# Patient Record
Sex: MALE | Race: WHITE | NOT HISPANIC OR LATINO | Employment: FULL TIME | URBAN - METROPOLITAN AREA
[De-identification: names, ages, dates, MRNs, and addresses within clinical notes are randomized per-mention and may not be internally consistent; named-entity substitution may affect disease eponyms.]

---

## 2023-09-29 ENCOUNTER — HOSPITAL ENCOUNTER (INPATIENT)
Facility: HOSPITAL | Age: 28
LOS: 2 days | Discharge: HOME | DRG: 369 | End: 2023-10-01
Attending: STUDENT IN AN ORGANIZED HEALTH CARE EDUCATION/TRAINING PROGRAM | Admitting: INTERNAL MEDICINE
Payer: COMMERCIAL

## 2023-09-29 DIAGNOSIS — R07.9 CHEST PAIN: ICD-10-CM

## 2023-09-29 DIAGNOSIS — R53.1 WEAKNESS: ICD-10-CM

## 2023-09-29 DIAGNOSIS — D64.9 SYMPTOMATIC ANEMIA: ICD-10-CM

## 2023-09-29 LAB
ABORH RETYPE: NORMAL
ALBUMIN SERPL-MCNC: 3 G/DL (ref 3.5–5)
ALBUMIN/GLOB SERPL: 0.9 RATIO (ref 1.1–2)
ALP SERPL-CCNC: 54 UNIT/L (ref 40–150)
ALT SERPL-CCNC: 11 UNIT/L (ref 0–55)
APPEARANCE UR: CLEAR
APTT PPP: 28 SECONDS (ref 23.2–33.7)
AST SERPL-CCNC: 13 UNIT/L (ref 5–34)
BACTERIA #/AREA URNS AUTO: NORMAL /HPF
BASOPHILS # BLD AUTO: 0.01 X10(3)/MCL
BASOPHILS NFR BLD AUTO: 0.1 %
BILIRUB SERPL-MCNC: 0.2 MG/DL
BILIRUB UR QL STRIP.AUTO: NEGATIVE
BNP BLD-MCNC: 146.5 PG/ML
BUN SERPL-MCNC: 13.2 MG/DL (ref 8.9–20.6)
CALCIUM SERPL-MCNC: 8.5 MG/DL (ref 8.4–10.2)
CHLORIDE SERPL-SCNC: 109 MMOL/L (ref 98–107)
CO2 SERPL-SCNC: 25 MMOL/L (ref 22–29)
COLOR UR AUTO: YELLOW
CREAT SERPL-MCNC: 1.33 MG/DL (ref 0.73–1.18)
EOSINOPHIL # BLD AUTO: 0.09 X10(3)/MCL (ref 0–0.9)
EOSINOPHIL NFR BLD AUTO: 1.1 %
ERYTHROCYTE [DISTWIDTH] IN BLOOD BY AUTOMATED COUNT: 18.5 % (ref 11.5–17)
FERRITIN SERPL-MCNC: 69.71 NG/ML (ref 21.81–274.66)
GFR SERPLBLD CREATININE-BSD FMLA CKD-EPI: >60 MLS/MIN/1.73/M2
GLOBULIN SER-MCNC: 3.5 GM/DL (ref 2.4–3.5)
GLUCOSE SERPL-MCNC: 84 MG/DL (ref 74–100)
GLUCOSE UR QL STRIP.AUTO: NEGATIVE
GROUP & RH: NORMAL
HCT VFR BLD AUTO: 15 % (ref 42–52)
HGB BLD-MCNC: 4.3 G/DL (ref 14–18)
IMM GRANULOCYTES # BLD AUTO: 0.03 X10(3)/MCL (ref 0–0.04)
IMM GRANULOCYTES NFR BLD AUTO: 0.4 %
INDIRECT COOMBS GEL: NORMAL
INR PPP: 1
IRON SATN MFR SERPL: 14 % (ref 20–50)
IRON SERPL-MCNC: 37 UG/DL (ref 65–175)
KETONES UR QL STRIP.AUTO: NEGATIVE
LDH SERPL-CCNC: 164 U/L (ref 125–220)
LEUKOCYTE ESTERASE UR QL STRIP.AUTO: NEGATIVE
LYMPHOCYTES # BLD AUTO: 2.11 X10(3)/MCL (ref 0.6–4.6)
LYMPHOCYTES NFR BLD AUTO: 26.8 %
MCH RBC QN AUTO: 26.9 PG (ref 27–31)
MCHC RBC AUTO-ENTMCNC: 28.7 G/DL (ref 33–36)
MCV RBC AUTO: 93.8 FL (ref 80–94)
MONOCYTES # BLD AUTO: 0.37 X10(3)/MCL (ref 0.1–1.3)
MONOCYTES NFR BLD AUTO: 4.7 %
NEUTROPHILS # BLD AUTO: 5.26 X10(3)/MCL (ref 2.1–9.2)
NEUTROPHILS NFR BLD AUTO: 66.9 %
NITRITE UR QL STRIP.AUTO: NEGATIVE
NRBC BLD AUTO-RTO: 0.6 %
PH UR STRIP.AUTO: 6 [PH]
PLATELET # BLD AUTO: 355 X10(3)/MCL (ref 130–400)
PMV BLD AUTO: 9.6 FL (ref 7.4–10.4)
POTASSIUM SERPL-SCNC: 3.9 MMOL/L (ref 3.5–5.1)
PROT SERPL-MCNC: 6.5 GM/DL (ref 6.4–8.3)
PROT UR QL STRIP.AUTO: NEGATIVE
PROTHROMBIN TIME: 12.8 SECONDS (ref 12.5–14.5)
RBC # BLD AUTO: 1.6 X10(6)/MCL (ref 4.7–6.1)
RBC #/AREA URNS AUTO: <5 /HPF
RBC UR QL AUTO: NEGATIVE
RET# (OHS): 0.21 X10E6/UL (ref 0.03–0.1)
RETICULOCYTE COUNT AUTOMATED (OLG): 13.96 % (ref 1.1–2.1)
SODIUM SERPL-SCNC: 144 MMOL/L (ref 136–145)
SP GR UR STRIP.AUTO: 1.01 (ref 1–1.03)
SPECIMEN OUTDATE: NORMAL
SQUAMOUS #/AREA URNS AUTO: <5 /HPF
TIBC SERPL-MCNC: 228 UG/DL (ref 69–240)
TIBC SERPL-MCNC: 265 UG/DL (ref 250–450)
TRANSFERRIN SERPL-MCNC: 234 MG/DL (ref 174–364)
TROPONIN I SERPL-MCNC: <0.01 NG/ML (ref 0–0.04)
TSH SERPL-ACNC: 1.81 UIU/ML (ref 0.35–4.94)
UROBILINOGEN UR STRIP-ACNC: 0.2
WBC # SPEC AUTO: 7.87 X10(3)/MCL (ref 4.5–11.5)
WBC #/AREA URNS AUTO: <5 /HPF

## 2023-09-29 PROCEDURE — 11000001 HC ACUTE MED/SURG PRIVATE ROOM

## 2023-09-29 PROCEDURE — 21400001 HC TELEMETRY ROOM

## 2023-09-29 PROCEDURE — 85730 THROMBOPLASTIN TIME PARTIAL: CPT | Performed by: PHYSICIAN ASSISTANT

## 2023-09-29 PROCEDURE — 82728 ASSAY OF FERRITIN: CPT | Performed by: STUDENT IN AN ORGANIZED HEALTH CARE EDUCATION/TRAINING PROGRAM

## 2023-09-29 PROCEDURE — 63600175 PHARM REV CODE 636 W HCPCS: Performed by: STUDENT IN AN ORGANIZED HEALTH CARE EDUCATION/TRAINING PROGRAM

## 2023-09-29 PROCEDURE — 99285 EMERGENCY DEPT VISIT HI MDM: CPT | Mod: 25

## 2023-09-29 PROCEDURE — 80053 COMPREHEN METABOLIC PANEL: CPT | Performed by: PHYSICIAN ASSISTANT

## 2023-09-29 PROCEDURE — 83615 LACTATE (LD) (LDH) ENZYME: CPT | Performed by: STUDENT IN AN ORGANIZED HEALTH CARE EDUCATION/TRAINING PROGRAM

## 2023-09-29 PROCEDURE — 84484 ASSAY OF TROPONIN QUANT: CPT | Performed by: PHYSICIAN ASSISTANT

## 2023-09-29 PROCEDURE — 86900 BLOOD TYPING SEROLOGIC ABO: CPT | Performed by: PHYSICIAN ASSISTANT

## 2023-09-29 PROCEDURE — 81001 URINALYSIS AUTO W/SCOPE: CPT | Performed by: PHYSICIAN ASSISTANT

## 2023-09-29 PROCEDURE — 83540 ASSAY OF IRON: CPT | Performed by: STUDENT IN AN ORGANIZED HEALTH CARE EDUCATION/TRAINING PROGRAM

## 2023-09-29 PROCEDURE — P9016 RBC LEUKOCYTES REDUCED: HCPCS | Performed by: STUDENT IN AN ORGANIZED HEALTH CARE EDUCATION/TRAINING PROGRAM

## 2023-09-29 PROCEDURE — 84443 ASSAY THYROID STIM HORMONE: CPT | Performed by: PHYSICIAN ASSISTANT

## 2023-09-29 PROCEDURE — 85045 AUTOMATED RETICULOCYTE COUNT: CPT | Performed by: STUDENT IN AN ORGANIZED HEALTH CARE EDUCATION/TRAINING PROGRAM

## 2023-09-29 PROCEDURE — 86923 COMPATIBILITY TEST ELECTRIC: CPT | Mod: 91 | Performed by: STUDENT IN AN ORGANIZED HEALTH CARE EDUCATION/TRAINING PROGRAM

## 2023-09-29 PROCEDURE — 63600175 PHARM REV CODE 636 W HCPCS: Performed by: NURSE PRACTITIONER

## 2023-09-29 PROCEDURE — 86923 COMPATIBILITY TEST ELECTRIC: CPT | Mod: 91 | Performed by: INTERNAL MEDICINE

## 2023-09-29 PROCEDURE — 83880 ASSAY OF NATRIURETIC PEPTIDE: CPT | Performed by: PHYSICIAN ASSISTANT

## 2023-09-29 PROCEDURE — 93005 ELECTROCARDIOGRAM TRACING: CPT

## 2023-09-29 PROCEDURE — C9113 INJ PANTOPRAZOLE SODIUM, VIA: HCPCS | Performed by: STUDENT IN AN ORGANIZED HEALTH CARE EDUCATION/TRAINING PROGRAM

## 2023-09-29 PROCEDURE — 96374 THER/PROPH/DIAG INJ IV PUSH: CPT

## 2023-09-29 PROCEDURE — 85610 PROTHROMBIN TIME: CPT | Performed by: PHYSICIAN ASSISTANT

## 2023-09-29 PROCEDURE — 85025 COMPLETE CBC W/AUTO DIFF WBC: CPT | Performed by: PHYSICIAN ASSISTANT

## 2023-09-29 PROCEDURE — 25500020 PHARM REV CODE 255: Performed by: STUDENT IN AN ORGANIZED HEALTH CARE EDUCATION/TRAINING PROGRAM

## 2023-09-29 RX ORDER — SODIUM CHLORIDE, SODIUM LACTATE, POTASSIUM CHLORIDE, CALCIUM CHLORIDE 600; 310; 30; 20 MG/100ML; MG/100ML; MG/100ML; MG/100ML
INJECTION, SOLUTION INTRAVENOUS CONTINUOUS
Status: DISCONTINUED | OUTPATIENT
Start: 2023-09-29 | End: 2023-10-01 | Stop reason: HOSPADM

## 2023-09-29 RX ORDER — NALOXONE HCL 0.4 MG/ML
0.02 VIAL (ML) INJECTION
Status: DISCONTINUED | OUTPATIENT
Start: 2023-09-29 | End: 2023-10-01 | Stop reason: HOSPADM

## 2023-09-29 RX ORDER — PANTOPRAZOLE SODIUM 40 MG/10ML
40 INJECTION, POWDER, LYOPHILIZED, FOR SOLUTION INTRAVENOUS 2 TIMES DAILY
Status: DISCONTINUED | OUTPATIENT
Start: 2023-09-30 | End: 2023-10-01

## 2023-09-29 RX ORDER — PANTOPRAZOLE SODIUM 40 MG/10ML
80 INJECTION, POWDER, LYOPHILIZED, FOR SOLUTION INTRAVENOUS
Status: COMPLETED | OUTPATIENT
Start: 2023-09-29 | End: 2023-09-29

## 2023-09-29 RX ORDER — UPADACITINIB 15 MG/1
15 TABLET, EXTENDED RELEASE ORAL
COMMUNITY

## 2023-09-29 RX ORDER — AMLODIPINE BESYLATE 10 MG/1
10 TABLET ORAL EVERY MORNING
COMMUNITY
Start: 2023-09-20

## 2023-09-29 RX ORDER — HYDRALAZINE HYDROCHLORIDE 20 MG/ML
10 INJECTION INTRAMUSCULAR; INTRAVENOUS EVERY 4 HOURS PRN
Status: DISCONTINUED | OUTPATIENT
Start: 2023-09-29 | End: 2023-10-01 | Stop reason: HOSPADM

## 2023-09-29 RX ORDER — MAG HYDROX/ALUMINUM HYD/SIMETH 200-200-20
30 SUSPENSION, ORAL (FINAL DOSE FORM) ORAL 4 TIMES DAILY PRN
Status: DISCONTINUED | OUTPATIENT
Start: 2023-09-29 | End: 2023-10-01 | Stop reason: HOSPADM

## 2023-09-29 RX ORDER — FEBUXOSTAT 80 MG/1
1 TABLET, FILM COATED ORAL
COMMUNITY
Start: 2023-09-02

## 2023-09-29 RX ORDER — ACETAMINOPHEN 325 MG/1
650 TABLET ORAL EVERY 6 HOURS PRN
Status: DISCONTINUED | OUTPATIENT
Start: 2023-09-29 | End: 2023-10-01 | Stop reason: HOSPADM

## 2023-09-29 RX ORDER — PROCHLORPERAZINE EDISYLATE 5 MG/ML
5 INJECTION INTRAMUSCULAR; INTRAVENOUS EVERY 6 HOURS PRN
Status: DISCONTINUED | OUTPATIENT
Start: 2023-09-29 | End: 2023-10-01 | Stop reason: HOSPADM

## 2023-09-29 RX ORDER — LOSARTAN POTASSIUM 100 MG/1
100 TABLET ORAL EVERY MORNING
COMMUNITY
Start: 2023-08-11

## 2023-09-29 RX ORDER — AMLODIPINE BESYLATE 5 MG/1
10 TABLET ORAL EVERY MORNING
Status: DISCONTINUED | OUTPATIENT
Start: 2023-09-30 | End: 2023-10-01 | Stop reason: HOSPADM

## 2023-09-29 RX ORDER — HYDROCODONE BITARTRATE AND ACETAMINOPHEN 500; 5 MG/1; MG/1
TABLET ORAL
Status: DISCONTINUED | OUTPATIENT
Start: 2023-09-29 | End: 2023-10-01 | Stop reason: HOSPADM

## 2023-09-29 RX ORDER — FEBUXOSTAT 40 MG/1
80 TABLET, FILM COATED ORAL DAILY
Status: DISCONTINUED | OUTPATIENT
Start: 2023-09-30 | End: 2023-10-01 | Stop reason: HOSPADM

## 2023-09-29 RX ORDER — SIMETHICONE 80 MG
1 TABLET,CHEWABLE ORAL 4 TIMES DAILY PRN
Status: DISCONTINUED | OUTPATIENT
Start: 2023-09-29 | End: 2023-10-01 | Stop reason: HOSPADM

## 2023-09-29 RX ORDER — ONDANSETRON 2 MG/ML
4 INJECTION INTRAMUSCULAR; INTRAVENOUS EVERY 4 HOURS PRN
Status: DISCONTINUED | OUTPATIENT
Start: 2023-09-29 | End: 2023-10-01 | Stop reason: HOSPADM

## 2023-09-29 RX ORDER — LABETALOL HYDROCHLORIDE 5 MG/ML
10 INJECTION, SOLUTION INTRAVENOUS
Status: DISCONTINUED | OUTPATIENT
Start: 2023-09-29 | End: 2023-10-01 | Stop reason: HOSPADM

## 2023-09-29 RX ORDER — TALC
6 POWDER (GRAM) TOPICAL NIGHTLY PRN
Status: DISCONTINUED | OUTPATIENT
Start: 2023-09-29 | End: 2023-10-01 | Stop reason: HOSPADM

## 2023-09-29 RX ORDER — SPIRONOLACTONE 25 MG/1
25 TABLET ORAL EVERY MORNING
COMMUNITY
Start: 2023-09-20

## 2023-09-29 RX ADMIN — LABETALOL HYDROCHLORIDE 10 MG: 5 INJECTION, SOLUTION INTRAVENOUS at 10:09

## 2023-09-29 RX ADMIN — IOPAMIDOL 100 ML: 755 INJECTION, SOLUTION INTRAVENOUS at 03:09

## 2023-09-29 RX ADMIN — PANTOPRAZOLE SODIUM 80 MG: 40 INJECTION, POWDER, FOR SOLUTION INTRAVENOUS at 03:09

## 2023-09-29 RX ADMIN — SODIUM CHLORIDE, POTASSIUM CHLORIDE, SODIUM LACTATE AND CALCIUM CHLORIDE: 600; 310; 30; 20 INJECTION, SOLUTION INTRAVENOUS at 08:09

## 2023-09-29 NOTE — ED PROVIDER NOTES
Encounter Date: 9/29/2023    SCRIBE #1 NOTE: I, Kj Marinelli, am scribing for, and in the presence of,  Dr. Zamarripa. I have scribed the following portions of the note - Other sections scribed: HPI, ROS, Physical Exam, MDM, Attending.       History     Chief Complaint   Patient presents with    Fatigue     POV with weakness, SOB, low blood counts at rheumatologist office, reports dark tarry stool. Reports hx HTN, hasn't taken meds x2 weeks. Denies blood thinners.     27 y/o male with history of HTN presents to ED for black stool.  Pt also complains of generalized weakness, SOB and abdominal cramping.  He was sent by rheumatologist for low H&H.  Pt has never required transfusion.  He has been off his HTN meds for about 2 weeks.  He is not on thinners.    The history is provided by the patient.     Review of patient's allergies indicates:  No Known Allergies  No past medical history on file.  No past surgical history on file.  No family history on file.     Review of Systems   Constitutional:         Generalized weakness    Respiratory:  Positive for shortness of breath.    Gastrointestinal:  Positive for abdominal pain (cramping) and blood in stool (black).       Physical Exam     Initial Vitals [09/29/23 1320]   BP Pulse Resp Temp SpO2   (!) 161/92 (!) 120 18 98.1 °F (36.7 °C) 100 %      MAP       --         Physical Exam    Nursing note and vitals reviewed.  Constitutional: He appears well-developed. He is not diaphoretic. No distress.   HENT:   Head: Normocephalic and atraumatic.   Nose: Nose normal.   Mouth/Throat: Oropharynx is clear and moist.   Eyes: Conjunctivae and EOM are normal. Pupils are equal, round, and reactive to light.   Cardiovascular:  Normal rate and regular rhythm.           No murmur heard.  Pulmonary/Chest: Breath sounds normal. No respiratory distress. He has no wheezes. He has no rales.   Abdominal: Abdomen is soft. He exhibits no distension. There is abdominal tenderness (umbilical).      Neurological: He is alert and oriented to person, place, and time. He has normal strength. No cranial nerve deficit.   Skin: Skin is warm. Capillary refill takes less than 2 seconds. No rash noted. There is pallor (mildly).         ED Course   Critical Care    Date/Time: 9/29/2023 7:39 PM    Performed by: Mendoza Zamarripa MD  Authorized by: Mendoza Zamarripa MD  Direct patient critical care time: 55 minutes  Total critical care time (exclusive of procedural time) : 55 minutes  Critical care time was exclusive of separately billable procedures and treating other patients.  Critical care was necessary to treat or prevent imminent or life-threatening deterioration of the following conditions: circulatory failure.  Critical care was time spent personally by me on the following activities: development of treatment plan with patient or surrogate, discussions with consultants, evaluation of patient's response to treatment, examination of patient, obtaining history from patient or surrogate, ordering and performing treatments and interventions, ordering and review of laboratory studies, pulse oximetry, re-evaluation of patient's condition, ordering and review of radiographic studies and review of old charts.        Labs Reviewed   COMPREHENSIVE METABOLIC PANEL - Abnormal; Notable for the following components:       Result Value    Chloride 109 (*)     Creatinine 1.33 (*)     Albumin Level 3.0 (*)     Albumin/Globulin Ratio 0.9 (*)     All other components within normal limits   CBC WITH DIFFERENTIAL - Abnormal; Notable for the following components:    RBC 1.60 (*)     Hgb 4.3 (*)     Hct 15.0 (*)     MCH 26.9 (*)     MCHC 28.7 (*)     RDW 18.5 (*)     All other components within normal limits   B-TYPE NATRIURETIC PEPTIDE - Abnormal; Notable for the following components:    Natriuretic Peptide 146.5 (*)     All other components within normal limits   RETICULOCYTES - Abnormal; Notable for the following  components:    Retic Cnt Auto 13.96 (*)     RET# 0.2068 (*)     All other components within normal limits   IRON AND TIBC - Abnormal; Notable for the following components:    Iron Level 37 (*)     Iron Saturation 14 (*)     All other components within normal limits   TROPONIN I - Normal   URINALYSIS, REFLEX TO URINE CULTURE - Normal   TSH - Normal   APTT - Normal   PROTIME-INR - Normal   LACTATE DEHYDROGENASE - Normal   FERRITIN - Normal   URINALYSIS, MICROSCOPIC - Normal   CBC W/ AUTO DIFFERENTIAL    Narrative:     The following orders were created for panel order CBC auto differential.  Procedure                               Abnormality         Status                     ---------                               -----------         ------                     CBC with Differential[[id number]]       Abnormal            Final result                 Please view results for these tests on the individual orders.   TYPE & SCREEN   ABORH RETYPE   PREPARE RBC SOFT        ECG Results              EKG 12-lead (Final result)  Result time 09/29/23 16:10:07      Final result by Interface, Lab In [facility] (09/29/23 16:10:07)                   Narrative:    Test Reason : R53.1,    Vent. Rate : 117 BPM     Atrial Rate : 117 BPM     P-R Int : 160 ms          QRS Dur : 080 ms      QT Int : 332 ms       P-R-T Axes : 042 030 007 degrees     QTc Int : 463 ms    Sinus tachycardia  Otherwise normal ECG  No previous ECGs available  Confirmed by Reuben Ugarte MD ([id number]) on 9/29/2023 4:09:57 PM    Referred By:             Confirmed By:Reuben Ugarte MD                                  Imaging Results              CT Abdomen Pelvis W Wo Contrast (Final result)  Result time 09/29/23 16:05:21      Final result by Reuben Elias MD (09/29/23 16:05:21)                   Impression:      No acute abnormality identified within the abdomen and pelvis.  No evidence of acute GI hemorrhage.    The bladder wall appears prominent.  Correlate with  urinalysis.      Electronically signed by: Reuben Elias  Date:    09/29/2023  Time:    16:05               Narrative:    EXAMINATION:  CT ABDOMEN PELVIS W WO CONTRAST    CLINICAL HISTORY:  GI bleed;Nausea/vomiting;periumbilical abd ttp;    TECHNIQUE:  Multidetector without and with IV contrast enhanced axial CT images of the abdomen and pelvis were obtained with coronal and sagittal reconstructions.    Automatic exposure control was utilized to reduce the patient's radiation dose.    DLP= 1810    COMPARISON:  No prior imaging available for comparison.    FINDINGS:  01. HEPATOBILIARY: No focal hepatic lesion is identified, The gallbladder is normal.    02. SPLEEN: Normal    03. PANCREAS: No focal masses or ductal dilatation.    04. ADRENALS: No adrenal nodules.    05. KIDNEYS: The right kidney demonstrates no stone, hydronephrosis, or hydroureter. No focal mass identified. The left kidney demonstrates no stone, hydronephrosis, or hydroureter. No focal mass identified.    06. LYMPHADENOPATHY/RETROPERITONEUM: There is no retroperitoneal lymphadenopathy. The abdominal aorta is normal in course and caliber.    07. BOWEL: No acute bowel related abnormalities. No evidence of appendiceal inflammation.    08. PELVIC VISCERA: The bladder wall appears prominent..  No pelvic mass.    09. PELVIC LYMPH NODES: No lymphadenopathy.    10. PERITONEUM/ABDOMINAL WALL: No ascites or implant.    11. SKELETAL: No aggressive appearing lytic/blastic lesion. No acute fractures, subluxations or dislocations.    12. LUNG BASES: The visualized lungs are unremarkable.                                       Medications   0.9%  NaCl infusion (for blood administration) (has no administration in time range)   melatonin tablet 6 mg (has no administration in time range)   ondansetron injection 4 mg (has no administration in time range)   prochlorperazine injection Soln 5 mg (has no administration in time range)   acetaminophen tablet 650 mg (has no  administration in time range)   simethicone chewable tablet 80 mg (has no administration in time range)   aluminum-magnesium hydroxide-simethicone 200-200-20 mg/5 mL suspension 30 mL (has no administration in time range)   naloxone 0.4 mg/mL injection 0.02 mg (has no administration in time range)   lactated ringers infusion (has no administration in time range)   pantoprazole injection 40 mg (has no administration in time range)   amLODIPine tablet 10 mg (has no administration in time range)   febuxostat tablet 80 mg (has no administration in time range)   hydrALAZINE injection 10 mg (has no administration in time range)   labetaloL injection 10 mg (has no administration in time range)   pantoprazole injection 80 mg (80 mg Intravenous Given 9/29/23 1520)   iopamidoL (ISOVUE-370) injection 100 mL (100 mLs Intravenous Given 9/29/23 1545)     Medical Decision Making  Amount and/or Complexity of Data Reviewed  Labs: ordered.  Radiology: ordered.    Risk  Prescription drug management.  Decision regarding hospitalization.    Differential diagnosis (includes but is not limited to):   Symptomatic anemia, GI bleed, upper GI bleed, lower GI bleed, diverticulitis, mass lesion    MDM Narrative  28-year-old male presents for evaluation of generalized fatigue and weakness with intermittent episodes of shortness of breath.  Patient was seen by his rheumatologist who referred him here as he was significantly anemic on outpatient labs.  He does report dark stools over the past couple of days as well.  He denies any anticoagulant medications.  He does have some abdominal tenderness to palpation to the periumbilical region.  Labs are pending including coags and type and screen.  CT scan of the abdomen with a GI bleed protocol has been ordered.  IV fluid rehydration ordered.  Pain and nausea control as needed, narcotic pain medications as needed.  Anticipate need for transfusion given results on outpatient labs, however we will confirm  on our lab assay prior to transfusion.  Digital rectal exam to be performed, patient has consented to exam.    Update:  NANCY with melanotic stool, Hemoccult positive, no bright red blood per rectum.  CT scan reviewed and is overall fairly unremarkable.  Hemoglobin has come back at 4.3.  3 units of packed red cells have been ordered for transfusion.  Gastroenterology consulted, appreciate recommendations.  Will order additional iron studies and other lab indices to further workup patient has anemia origin.  Hospital Medicine consulted and has accepted the patient for admission.  Patient agrees with plan for care and has no questions at this time.  Patient remains hemodynamically stable and stable on room air.    Dispo:  Admit    My independent radiology interpretation:  As above  Point of care US (independently performed and interpreted):   Decision rules/clinical scoring:     Sepsis Perfusion Assessment:     Amount and/or Complexity of Data Reviewed  Independent historian: none   Summary of history:   External data reviewed: notes from previous admissions, notes from previous ED visits, and notes from clinic visits  Summary of data reviewed:  Prior records reviewed  Risk and benefits of testing: discussed   Labs: ordered and reviewed  Radiology: ordered and independent interpretation performed (see above or ED course)  ECG/medicine tests: ordered and independent interpretation performed (see above or ED course)  Discussion of management or test interpretation with external provider(s): discussed with hospitalist physician and discussed with GI consultant   Summary of discussion:  As above    Risk  Parenteral controlled substances   Drug therapy requiring intense monitoring for toxicity   Decision regarding hospitalization  Shared decision making     Critical Care  30-74 minutes     Data Reviewed/Counseling: I have personally reviewed the patient's vital signs, nursing notes, and other relevant tests, information, and  imaging. I had a detailed discussion regarding the historical points, exam findings, and any diagnostic results supporting the discharge diagnosis. I personally performed the history, PE, MDM and procedures as documented above and agree with the scribe's documentation.    Portions of this note were dictated using voice recognition software. Although it was reviewed for accuracy, some inherent voice recognition errors may have occurred and may be present in this document.          Scribe Attestation:   Scribe #1: I performed the above scribed service and the documentation accurately describes the services I performed. I attest to the accuracy of the note.    Attending Attestation:           Physician Attestation for Scribe:  Physician Attestation Statement for Scribe #1: I, reviewed documentation, as scribed by Kj Marinelli in my presence, and it is both accurate and complete.             ED Course as of 09/29/23 2036   Fri Sep 29, 2023   1705 NANCY: No brisk bleeding, no BRBPR, positive hemoccult, no mass [MC]   1942 EKG independently interpreted by me.  EKG: ST @ 117, no STEMI, QTc 463 [MC]      ED Course User Index  [MC] Mendoza Zamarripa MD                    Clinical Impression:   Final diagnoses:  [R53.1] Weakness  [D64.9] Symptomatic anemia        ED Disposition Condition    Admit Stable                Mendoza Zamarripa MD  09/29/23 1942       Mendoza Zamarripa MD  09/29/23 2036

## 2023-09-29 NOTE — FIRST PROVIDER EVALUATION
Medical screening examination initiated.  I have conducted a focused provider triage encounter, findings are as follows:    Chief Complaint   Patient presents with    Fatigue     POV with weakness, SOB, low blood counts at rheumatologist office, reports dark tarry stool. Reports hx HTN, hasn't taken meds x2 weeks. Denies blood thinners.     Brief history of present illness:  28 y.o. male presents to the ED with weakness, lightheadedness, and palpitations onset 2 weeks ago with dyspnea on exertion. States he has had melena that began about a week ago. Saw rheumatologist with labs done yesterday showing low H&H, unsure of values. Hx of HTN, has not taken meds in the past 2 weeks. Hx of gout. Denies abdominal pain, chest pain.    Vitals:    09/29/23 1320   BP: (!) 161/92   Pulse: (!) 120   Resp: 18   Temp: 98.1 °F (36.7 °C)   TempSrc: Oral   SpO2: 100%       Pertinent physical exam:  Awake, alert, ambulatory, non-labored respirations    Brief workup plan:  labs, UA, EKG    Preliminary workup initiated; this workup will be continued and followed by the physician or advanced practice provider that is assigned to the patient when roomed.

## 2023-09-30 ENCOUNTER — ANESTHESIA EVENT (OUTPATIENT)
Dept: SURGERY | Facility: HOSPITAL | Age: 28
DRG: 369 | End: 2023-09-30
Payer: COMMERCIAL

## 2023-09-30 ENCOUNTER — ANESTHESIA (OUTPATIENT)
Dept: SURGERY | Facility: HOSPITAL | Age: 28
DRG: 369 | End: 2023-09-30
Payer: COMMERCIAL

## 2023-09-30 LAB
ABO + RH BLD: NORMAL
ALBUMIN SERPL-MCNC: 2.9 G/DL (ref 3.5–5)
ALBUMIN/GLOB SERPL: 0.8 RATIO (ref 1.1–2)
ALP SERPL-CCNC: 59 UNIT/L (ref 40–150)
ALT SERPL-CCNC: 12 UNIT/L (ref 0–55)
AST SERPL-CCNC: 13 UNIT/L (ref 5–34)
BASOPHILS # BLD AUTO: 0.02 X10(3)/MCL
BASOPHILS NFR BLD AUTO: 0.3 %
BILIRUB SERPL-MCNC: 0.4 MG/DL
BLD PROD TYP BPU: NORMAL
BLOOD UNIT EXPIRATION DATE: NORMAL
BLOOD UNIT TYPE CODE: 1700
BLOOD UNIT TYPE CODE: 7300
BUN SERPL-MCNC: 9.7 MG/DL (ref 8.9–20.6)
CALCIUM SERPL-MCNC: 8.2 MG/DL (ref 8.4–10.2)
CHLORIDE SERPL-SCNC: 112 MMOL/L (ref 98–107)
CO2 SERPL-SCNC: 17 MMOL/L (ref 22–29)
CREAT SERPL-MCNC: 1.07 MG/DL (ref 0.73–1.18)
CROSSMATCH INTERPRETATION: NORMAL
DISPENSE STATUS: NORMAL
EOSINOPHIL # BLD AUTO: 0.12 X10(3)/MCL (ref 0–0.9)
EOSINOPHIL NFR BLD AUTO: 1.5 %
ERYTHROCYTE [DISTWIDTH] IN BLOOD BY AUTOMATED COUNT: 16.4 % (ref 11.5–17)
GFR SERPLBLD CREATININE-BSD FMLA CKD-EPI: >60 MLS/MIN/1.73/M2
GLOBULIN SER-MCNC: 3.6 GM/DL (ref 2.4–3.5)
GLUCOSE SERPL-MCNC: 81 MG/DL (ref 74–100)
HCT VFR BLD AUTO: 21.4 % (ref 42–52)
HGB BLD-MCNC: 6.7 G/DL (ref 14–18)
IMM GRANULOCYTES # BLD AUTO: 0.03 X10(3)/MCL (ref 0–0.04)
IMM GRANULOCYTES NFR BLD AUTO: 0.4 %
LYMPHOCYTES # BLD AUTO: 0.98 X10(3)/MCL (ref 0.6–4.6)
LYMPHOCYTES NFR BLD AUTO: 12.4 %
MAGNESIUM SERPL-MCNC: 2.1 MG/DL (ref 1.6–2.6)
MCH RBC QN AUTO: 27.3 PG (ref 27–31)
MCHC RBC AUTO-ENTMCNC: 31.3 G/DL (ref 33–36)
MCV RBC AUTO: 87.3 FL (ref 80–94)
MONOCYTES # BLD AUTO: 0.47 X10(3)/MCL (ref 0.1–1.3)
MONOCYTES NFR BLD AUTO: 6 %
NEUTROPHILS # BLD AUTO: 6.27 X10(3)/MCL (ref 2.1–9.2)
NEUTROPHILS NFR BLD AUTO: 79.4 %
NRBC BLD AUTO-RTO: 0.4 %
PHOSPHATE SERPL-MCNC: 3.9 MG/DL (ref 2.3–4.7)
PLATELET # BLD AUTO: 290 X10(3)/MCL (ref 130–400)
PMV BLD AUTO: 9 FL (ref 7.4–10.4)
POTASSIUM SERPL-SCNC: 4.1 MMOL/L (ref 3.5–5.1)
PROT SERPL-MCNC: 6.5 GM/DL (ref 6.4–8.3)
RBC # BLD AUTO: 2.45 X10(6)/MCL (ref 4.7–6.1)
SODIUM SERPL-SCNC: 142 MMOL/L (ref 136–145)
UNIT NUMBER: NORMAL
WBC # SPEC AUTO: 7.89 X10(3)/MCL (ref 4.5–11.5)

## 2023-09-30 PROCEDURE — 63600175 PHARM REV CODE 636 W HCPCS: Performed by: NURSE ANESTHETIST, CERTIFIED REGISTERED

## 2023-09-30 PROCEDURE — D9220A PRA ANESTHESIA: Mod: CRNA,,, | Performed by: NURSE ANESTHETIST, CERTIFIED REGISTERED

## 2023-09-30 PROCEDURE — 25000003 PHARM REV CODE 250: Performed by: ANESTHESIOLOGY

## 2023-09-30 PROCEDURE — 85025 COMPLETE CBC W/AUTO DIFF WBC: CPT | Performed by: INTERNAL MEDICINE

## 2023-09-30 PROCEDURE — 37000009 HC ANESTHESIA EA ADD 15 MINS: Performed by: INTERNAL MEDICINE

## 2023-09-30 PROCEDURE — 37000008 HC ANESTHESIA 1ST 15 MINUTES: Performed by: INTERNAL MEDICINE

## 2023-09-30 PROCEDURE — D9220A PRA ANESTHESIA: ICD-10-PCS | Mod: ANES,,, | Performed by: ANESTHESIOLOGY

## 2023-09-30 PROCEDURE — C9113 INJ PANTOPRAZOLE SODIUM, VIA: HCPCS | Performed by: NURSE PRACTITIONER

## 2023-09-30 PROCEDURE — 83735 ASSAY OF MAGNESIUM: CPT | Performed by: INTERNAL MEDICINE

## 2023-09-30 PROCEDURE — 25000003 PHARM REV CODE 250: Performed by: NURSE ANESTHETIST, CERTIFIED REGISTERED

## 2023-09-30 PROCEDURE — 84100 ASSAY OF PHOSPHORUS: CPT | Performed by: INTERNAL MEDICINE

## 2023-09-30 PROCEDURE — D9220A PRA ANESTHESIA: ICD-10-PCS | Mod: CRNA,,, | Performed by: NURSE ANESTHETIST, CERTIFIED REGISTERED

## 2023-09-30 PROCEDURE — 21400001 HC TELEMETRY ROOM

## 2023-09-30 PROCEDURE — 43235 EGD DIAGNOSTIC BRUSH WASH: CPT | Performed by: INTERNAL MEDICINE

## 2023-09-30 PROCEDURE — 25000003 PHARM REV CODE 250: Performed by: INTERNAL MEDICINE

## 2023-09-30 PROCEDURE — 80053 COMPREHEN METABOLIC PANEL: CPT | Performed by: INTERNAL MEDICINE

## 2023-09-30 PROCEDURE — 63600175 PHARM REV CODE 636 W HCPCS: Performed by: NURSE PRACTITIONER

## 2023-09-30 PROCEDURE — D9220A PRA ANESTHESIA: Mod: ANES,,, | Performed by: ANESTHESIOLOGY

## 2023-09-30 PROCEDURE — 11000001 HC ACUTE MED/SURG PRIVATE ROOM

## 2023-09-30 PROCEDURE — P9016 RBC LEUKOCYTES REDUCED: HCPCS | Performed by: STUDENT IN AN ORGANIZED HEALTH CARE EDUCATION/TRAINING PROGRAM

## 2023-09-30 PROCEDURE — P9016 RBC LEUKOCYTES REDUCED: HCPCS | Performed by: INTERNAL MEDICINE

## 2023-09-30 RX ORDER — LIDOCAINE HYDROCHLORIDE 20 MG/ML
INJECTION, SOLUTION EPIDURAL; INFILTRATION; INTRACAUDAL; PERINEURAL
Status: DISCONTINUED | OUTPATIENT
Start: 2023-09-30 | End: 2023-09-30

## 2023-09-30 RX ORDER — PROPOFOL 10 MG/ML
VIAL (ML) INTRAVENOUS
Status: DISCONTINUED | OUTPATIENT
Start: 2023-09-30 | End: 2023-09-30

## 2023-09-30 RX ORDER — SODIUM CHLORIDE 0.9 % (FLUSH) 0.9 %
10 SYRINGE (ML) INJECTION
Status: CANCELLED | OUTPATIENT
Start: 2023-09-30

## 2023-09-30 RX ORDER — FAMOTIDINE 10 MG/ML
20 INJECTION INTRAVENOUS ONCE
Status: COMPLETED | OUTPATIENT
Start: 2023-09-30 | End: 2023-09-30

## 2023-09-30 RX ORDER — SODIUM CITRATE AND CITRIC ACID MONOHYDRATE 334; 500 MG/5ML; MG/5ML
30 SOLUTION ORAL ONCE
Status: COMPLETED | OUTPATIENT
Start: 2023-09-30 | End: 2023-09-30

## 2023-09-30 RX ORDER — LABETALOL 200 MG/1
200 TABLET, FILM COATED ORAL EVERY 12 HOURS
Status: DISCONTINUED | OUTPATIENT
Start: 2023-09-30 | End: 2023-09-30

## 2023-09-30 RX ORDER — HYDROCODONE BITARTRATE AND ACETAMINOPHEN 500; 5 MG/1; MG/1
TABLET ORAL ONCE
Status: DISCONTINUED | OUTPATIENT
Start: 2023-09-30 | End: 2023-10-01 | Stop reason: HOSPADM

## 2023-09-30 RX ORDER — EPINEPHRINE 0.1 MG/ML
INJECTION INTRAVENOUS
Status: DISCONTINUED
Start: 2023-09-30 | End: 2023-09-30 | Stop reason: WASHOUT

## 2023-09-30 RX ORDER — LABETALOL 200 MG/1
200 TABLET, FILM COATED ORAL EVERY 12 HOURS
Status: DISCONTINUED | OUTPATIENT
Start: 2023-09-30 | End: 2023-10-01 | Stop reason: HOSPADM

## 2023-09-30 RX ORDER — LIDOCAINE HYDROCHLORIDE 10 MG/ML
1 INJECTION, SOLUTION EPIDURAL; INFILTRATION; INTRACAUDAL; PERINEURAL ONCE
Status: DISCONTINUED | OUTPATIENT
Start: 2023-09-30 | End: 2023-10-01 | Stop reason: HOSPADM

## 2023-09-30 RX ADMIN — PROPOFOL 50 MG: 10 INJECTION, EMULSION INTRAVENOUS at 01:09

## 2023-09-30 RX ADMIN — PANTOPRAZOLE SODIUM 40 MG: 40 INJECTION, POWDER, FOR SOLUTION INTRAVENOUS at 09:09

## 2023-09-30 RX ADMIN — FAMOTIDINE 20 MG: 10 INJECTION, SOLUTION INTRAVENOUS at 12:09

## 2023-09-30 RX ADMIN — SODIUM CITRATE AND CITRIC ACID MONOHYDRATE 30 ML: 500; 334 SOLUTION ORAL at 12:09

## 2023-09-30 RX ADMIN — LABETALOL HYDROCHLORIDE 5 MG: 5 INJECTION, SOLUTION INTRAVENOUS at 11:09

## 2023-09-30 RX ADMIN — SODIUM CHLORIDE, SODIUM GLUCONATE, SODIUM ACETATE, POTASSIUM CHLORIDE AND MAGNESIUM CHLORIDE: 526; 502; 368; 37; 30 INJECTION, SOLUTION INTRAVENOUS at 01:09

## 2023-09-30 RX ADMIN — LABETALOL HYDROCHLORIDE 200 MG: 200 TABLET, FILM COATED ORAL at 10:09

## 2023-09-30 RX ADMIN — LIDOCAINE HYDROCHLORIDE 60 MG: 20 INJECTION, SOLUTION EPIDURAL; INFILTRATION; INTRACAUDAL; PERINEURAL at 01:09

## 2023-09-30 RX ADMIN — PANTOPRAZOLE SODIUM 40 MG: 40 INJECTION, POWDER, FOR SOLUTION INTRAVENOUS at 10:09

## 2023-09-30 NOTE — CONSULTS
Gastroenterology Consultation Note    Reason for Consult:  Diagnoses of Weakness, Symptomatic anemia, and Chest pain were pertinent to this visit.    PCP:   Edmond Boyer DO    Referring MD:  Self, Bryan  No address on file    Hospital Day: 1     Initial History of Present Illness (HPI):  This is a 28 y.o. male,  pmh of HTN and Rheumatoid arthritis presented to the ED with c/o fatigue, SOB.  He states that he had a wellness visit with his PCP on 9/20 and was noted to have low blood counts.  He does state that he has had black stool for about a week now. He denies anticoagulant use, takes 3 ALEVE almost daily for pain, 12 pack beer on weekends up until a month ago. .  Denies abdominal pain, fever, chills, or chest pain.     ED vitals on arrival:  Temp 98.1° F, pulse 120, resp 18, /92, SpO2 100% on RA.  ED lab work revealed H&H 4.3/15, retic count  13.96, BUN 13.2, creatinine 1.33, .5.  EKG showed sinus tachycardia, otherwise normal EKG.  .  CT abdomen pelvis with and without contrast showed no acute abnormality identified within the abdomen and pelvis.  No evidence of acute GI hemorrhage.  The bladder wall appears prominent.  Correlate with urinalysis.  Hemoccult positive on digital exam in ED. On Rinvaq (NSAID/DMARD) for his RA. He was given 80 Protonix and Type and screened for 3 units PRBCs. We are consulted for GI bleed.     ROS:  Constitutional:         Generalized weakness    Respiratory:  Positive for shortness of breath.    Gastrointestinal:  Positive for abdominal pain (cramping) and blood in stool (black).     Medical History:   Hypertension  Rheumatoid arthritis    Surgical History:   Denies past surgery    Family History:   Reviewed and non-contributory     Social History:   Social History     Tobacco Use    Smoking status: Not on file    Smokeless tobacco: Not on file   Substance Use Topics    Alcohol use: Not on file       Allergies:  Review of patient's allergies  indicates:  No Known Allergies    Medications Prior to Admission   Medication Sig Dispense Refill Last Dose    amLODIPine (NORVASC) 10 MG tablet Take 10 mg by mouth every morning.   9/28/2023    febuxostat (ULORIC) 80 mg Tab Take 1 tablet by mouth.   9/29/2023    losartan (COZAAR) 100 MG tablet Take 100 mg by mouth every morning.   9/28/2023    spironolactone (ALDACTONE) 25 MG tablet Take 25 mg by mouth every morning.   9/28/2023    upadacitinib (RINVOQ) 15 mg 24 hr tablet Take 15 mg by mouth.   9/29/2023         Vital Signs:  BP (!) 144/88   Pulse 86   Temp 99 °F (37.2 °C) (Oral)   Resp 18   Wt 114.3 kg (252 lb)   SpO2 (!) 94%   There is no height or weight on file to calculate BMI.    Physical Exam:  Constitutional: He appears well-developed. He is not diaphoretic. No distress.   HENT:   Head: Normocephalic and atraumatic.   Nose: Nose normal.   Mouth/Throat: Oropharynx is clear and moist.   Eyes: Conjunctivae and EOM are normal. Pupils are equal, round, and reactive to light.   Cardiovascular:  Normal rate and regular rhythm.           No murmur heard.  Pulmonary/Chest: Breath sounds normal. No respiratory distress. He has no wheezes. He has no rales.   Abdominal: Abdomen is soft. He exhibits no distension. There is abdominal tenderness (umbilical).      Neurological: He is alert and oriented to person, place, and time. He has normal strength. No cranial nerve deficit.   Skin: Skin is warm. Capillary refill takes less than 2 seconds. No rash noted. There is pallor (mildly).        Labs:  Recent Results (from the past 48 hour(s))   Comprehensive metabolic panel    Collection Time: 09/29/23  1:52 PM   Result Value Ref Range    Sodium Level 144 136 - 145 mmol/L    Potassium Level 3.9 3.5 - 5.1 mmol/L    Chloride 109 (H) 98 - 107 mmol/L    Carbon Dioxide 25 22 - 29 mmol/L    Glucose Level 84 74 - 100 mg/dL    Blood Urea Nitrogen 13.2 8.9 - 20.6 mg/dL    Creatinine 1.33 (H) 0.73 - 1.18 mg/dL    Calcium Level  Total 8.5 8.4 - 10.2 mg/dL    Protein Total 6.5 6.4 - 8.3 gm/dL    Albumin Level 3.0 (L) 3.5 - 5.0 g/dL    Globulin 3.5 2.4 - 3.5 gm/dL    Albumin/Globulin Ratio 0.9 (L) 1.1 - 2.0 ratio    Bilirubin Total 0.2 <=1.5 mg/dL    Alkaline Phosphatase 54 40 - 150 unit/L    Alanine Aminotransferase 11 0 - 55 unit/L    Aspartate Aminotransferase 13 5 - 34 unit/L    eGFR >60 mls/min/1.73/m2   Troponin I    Collection Time: 09/29/23  1:52 PM   Result Value Ref Range    Troponin-I <0.010 0.000 - 0.045 ng/mL   Type & Screen    Collection Time: 09/29/23  1:52 PM   Result Value Ref Range    Group & Rh B POS     Indirect Brii GEL NEG     Specimen Outdate 10/02/2023 23:59    TSH    Collection Time: 09/29/23  1:52 PM   Result Value Ref Range    Thyroid Stimulating Hormone 1.812 0.350 - 4.940 uIU/mL   APTT    Collection Time: 09/29/23  1:52 PM   Result Value Ref Range    PTT 28.0 23.2 - 33.7 seconds   Protime-INR    Collection Time: 09/29/23  1:52 PM   Result Value Ref Range    PT 12.8 12.5 - 14.5 seconds    INR 1.0 <=1.3   CBC with Differential    Collection Time: 09/29/23  1:52 PM   Result Value Ref Range    WBC 7.87 4.50 - 11.50 x10(3)/mcL    RBC 1.60 (L) 4.70 - 6.10 x10(6)/mcL    Hgb 4.3 (LL) 14.0 - 18.0 g/dL    Hct 15.0 (LL) 42.0 - 52.0 %    MCV 93.8 80.0 - 94.0 fL    MCH 26.9 (L) 27.0 - 31.0 pg    MCHC 28.7 (L) 33.0 - 36.0 g/dL    RDW 18.5 (H) 11.5 - 17.0 %    Platelet 355 130 - 400 x10(3)/mcL    MPV 9.6 7.4 - 10.4 fL    Neut % 66.9 %    Lymph % 26.8 %    Mono % 4.7 %    Eos % 1.1 %    Basophil % 0.1 %    Lymph # 2.11 0.6 - 4.6 x10(3)/mcL    Neut # 5.26 2.1 - 9.2 x10(3)/mcL    Mono # 0.37 0.1 - 1.3 x10(3)/mcL    Eos # 0.09 0 - 0.9 x10(3)/mcL    Baso # 0.01 <=0.2 x10(3)/mcL    IG# 0.03 0 - 0.04 x10(3)/mcL    IG% 0.4 %    NRBC% 0.6 %   Brain natriuretic peptide    Collection Time: 09/29/23  1:52 PM   Result Value Ref Range    Natriuretic Peptide 146.5 (H) <=100.0 pg/mL   Prepare RBC 3 Units; emergency    Collection Time:  09/29/23  1:52 PM   Result Value Ref Range    UNIT NUMBER [id number]     UNIT ABO/RH B POS     DISPENSE STATUS Issued     Unit Expiration [id number]     Product Code J5711K12     Unit Blood Type Code 7300     CROSSMATCH INTERPRETATION Compatible     UNIT NUMBER [id number]     UNIT ABO/RH B POS     DISPENSE STATUS Transfused     Unit Expiration [id number]     Product Code T9324V14     Unit Blood Type Code 7300     CROSSMATCH INTERPRETATION Compatible     UNIT NUMBER [id number]     UNIT ABO/RH B POS     DISPENSE STATUS Transfused     Unit Expiration [id number]     Product Code G1553Y24     Unit Blood Type Code 7300     CROSSMATCH INTERPRETATION Compatible    Lactate Dehydrogenase    Collection Time: 09/29/23  1:52 PM   Result Value Ref Range    Lactate Dehydrogenase 164 125 - 220 U/L   Ferritin    Collection Time: 09/29/23  1:52 PM   Result Value Ref Range    Ferritin Level 69.71 21.81 - 274.66 ng/mL   Reticulocytes    Collection Time: 09/29/23  1:52 PM   Result Value Ref Range    Retic Cnt Auto 13.96 (H) 1.1 - 2.1 %    RET# 0.2068 (H) 0.026 - 0.095 x10e6/uL   Iron Panel    Collection Time: 09/29/23  1:52 PM   Result Value Ref Range    Iron Binding Capacity Unsaturated 228 69 - 240 ug/dL    Iron Level 37 (L) 65 - 175 ug/dL    Transferrin 234 174 - 364 mg/dL    Iron Binding Capacity Total 265 250 - 450 ug/dL    Iron Saturation 14 (L) 20 - 50 %   ABORH RETYPE    Collection Time: 09/29/23  4:10 PM   Result Value Ref Range    ABORH Retype B POS    Urinalysis, Reflex to Urine Culture    Collection Time: 09/29/23  5:01 PM    Specimen: Urine   Result Value Ref Range    Color, UA Yellow Yellow, Light-Yellow, Dark Yellow, [place], Straw    Appearance, UA Clear Clear    Specific Gravity, UA 1.013 1.005 - 1.030    pH, UA 6.0 5.0 - 8.5    Protein, UA Negative Negative    Glucose, UA Negative Negative, Normal    Ketones, UA Negative Negative    Blood, UA Negative Negative    Bilirubin, UA Negative Negative     Urobilinogen, UA 0.2 0.2, 1.0, Normal    Nitrites, UA Negative Negative    Leukocyte Esterase, UA Negative Negative   Urinalysis, Microscopic    Collection Time: 09/29/23  5:01 PM   Result Value Ref Range    RBC, UA <5 <=5 /HPF    WBC, UA <5 <=5 /HPF    Squamous Epithelial Cells, UA <5 <=5 /HPF    Bacteria, UA None Seen None Seen, Rare, Occasional /HPF       CT Abdomen Pelvis W Wo Contrast   Final Result      No acute abnormality identified within the abdomen and pelvis.  No evidence of acute GI hemorrhage.      The bladder wall appears prominent.  Correlate with urinalysis.         Electronically signed by: Reuben Elias   Date:    09/29/2023   Time:    16:05          Imaging: reviewed    Endoscopy:   none      Assessment/Plan:  Symptomatic anemia  Hgb 4.3  Getting 3 units PRBC  PPI  Heme + on digital  Iron - 37  RA  On Rimvoq (NSAID/ DMARD)  HTN  NSAID use      Will plan EGD today  Continue PPI  Avoid NSAIDS    Thank you for allowing us to participate in the care of Jonathon Patterson.    Aliyah Newby NP as scribe for Dr. Rob Killian

## 2023-09-30 NOTE — PROGRESS NOTES
[facility] Hospital Medicine Progress Note        Chief Complaint: Inpatient follow-up on melena    HPI:   Mr. Padilla is a 28 year old male with a pmh of HTN and Rheumatoid arthritis presented to the ED with c/o fatigue, SOB.  He states that he had a wellness visit with his PCP on 9/20 and was noted to have low blood counts.  He does state that he has had black stool for about a week now. He denies anticoagulant use, regular NSAID use, or heavy etoh use.  Denies abdominal pain, fever, chills, or chest pain.     ED vitals on arrival:  Temp 98.1° F, pulse 120, resp 18, /92, SpO2 100% on RA.  ED lab work revealed H&H 4.3/15, retake 13.96, creatinine 1.33, .5.  EKG showed sinus tachycardia, otherwise normal EKG.  .  CT abdomen pelvis with and without contrast showed no acute abnormality identified within the abdomen and pelvis.  No evidence of acute GI hemorrhage.  The bladder wall appears prominent.  Correlate with urinalysis.  He was given 80 Protonix and Type and screened for 3 units PRBCs.  GI was consulted and he was admitted to hospital medicine for management    Interval Hx:   Patient is without any new complaints.  He is currently awaiting endoscopy.  Patient is afebrile, on room air, and hemodynamically stable.        Objective/physical exam:  General: in no acute distress  HENT: normocephalic, atraumatic  Eye: PERRL, EOMI, clear conjunctiva  Neck: full ROM, no thyromegaly, no JVD  Respiratory: clear to auscultation bilaterally  Cardiovascular: regular rate and rhythm  Gastrointestinal: non-distended, positive bowel sounds, non-tender  Musculoskeletal: no gross deformity  Integumentary: warm, dry, intact, no rashes  Neurological: cranial nerves grossly intact, no focal neurological deficit  Psychiatric: cooperative, anxious      VITAL SIGNS: 24 HRS MIN & MAX LAST   Temp  Min: 98.1 °F (36.7 °C)  Max: 99.3 °F (37.4 °C) 98.4 °F (36.9 °C)   BP  Min: 127/87  Max:  176/105 (!) 163/100   Pulse  Min: 86  Max: 120  98   Resp  Min: 17  Max: 20 19   SpO2  Min: 94 %  Max: 100 % 98 % (room air)     I have reviewed the following labs:  Recent Labs   Lab 09/29/23  1352 09/30/23  1015   WBC 7.87 7.89   RBC 1.60* 2.45*   HGB 4.3* 6.7*   HCT 15.0* 21.4*   MCV 93.8 87.3   MCH 26.9* 27.3   MCHC 28.7* 31.3*   RDW 18.5* 16.4    290   MPV 9.6 9.0     Recent Labs   Lab 09/29/23  1352 09/30/23  0815    142   K 3.9 4.1   CO2 25 17*   BUN 13.2 9.7   CREATININE 1.33* 1.07   CALCIUM 8.5 8.2*   MG  --  2.10   ALBUMIN 3.0* 2.9*   ALKPHOS 54 59   ALT 11 12   AST 13 13   BILITOT 0.2 0.4     Microbiology Results (last 7 days)       ** No results found for the last 168 hours. **             See below for Radiology    Scheduled Med:   EPINEPHrine        0.9%  NaCl infusion (for blood administration)   Intravenous Once    amLODIPine  10 mg Oral QAM    febuxostat  80 mg Oral Daily    LIDOcaine (PF) 10 mg/ml (1%)  1 mL Intradermal Once    pantoprazole  40 mg Intravenous BID      Continuous Infusions:   lactated ringers 100 mL/hr at 09/29/23 2055      PRN Meds:  EPINEPHrine, 0.9%  NaCl infusion (for blood administration), acetaminophen, aluminum-magnesium hydroxide-simethicone, hydrALAZINE, labetaloL, melatonin, naloxone, ondansetron, prochlorperazine, simethicone     Assessment/Plan:  Melena   Iron-deficiency anemia   Acute kidney injury, resolved  Rheumatoid arthritis  Immunocompromised   Hypertensive urgency      Plan:  Blood pressure control  Patient has been transfused.  Continue to monitor hemoglobin and transfuse to keep greater than 7  Patient is slated for endoscopy per Gastroenterology      Critical Care Diagnosis: Hypertensive Urgency requiring IV antihypertensives  Critical Care Interventions: Hands-on evaluation, review of labs, radiographs, medical records, and discussion with the patient and medical staff in order to assess and manage the high probability of imminent or  life-threatening deterioration of cardio-respiratory status requiring vasopressor support and/or intubation and mechanical ventilation.  Critical Care Time Spent: 35 minutes        VTE prophylaxis:  SCDs    Patient condition:  Stable    Anticipated discharge and Disposition:   TBD      All diagnosis and differential diagnosis have been reviewed; assessment and plan has been documented; I have personally reviewed the labs and test results that are presently available; I have reviewed the patients medication list; I have reviewed the consulting providers response and recommendations. I have reviewed or attempted to review medical records based upon their availability    All of the patient's questions have been  addressed and answered. Patient's is agreeable to the above stated plan. I will continue to monitor closely and make adjustments to medical management as needed.  _____________________________________________________________________      Radiology:  I have personally reviewed the following imaging and agree with the radiologist.     CT Abdomen Pelvis W Wo Contrast  Narrative: EXAMINATION:  CT ABDOMEN PELVIS W WO CONTRAST    CLINICAL HISTORY:  GI bleed;Nausea/vomiting;periumbilical abd ttp;    TECHNIQUE:  Multidetector without and with IV contrast enhanced axial CT images of the abdomen and pelvis were obtained with coronal and sagittal reconstructions.    Automatic exposure control was utilized to reduce the patient's radiation dose.    DLP= 1810    COMPARISON:  No prior imaging available for comparison.    FINDINGS:  01. HEPATOBILIARY: No focal hepatic lesion is identified, The gallbladder is normal.    02. SPLEEN: Normal    03. PANCREAS: No focal masses or ductal dilatation.    04. ADRENALS: No adrenal nodules.    05. KIDNEYS: The right kidney demonstrates no stone, hydronephrosis, or hydroureter. No focal mass identified. The left kidney demonstrates no stone, hydronephrosis, or hydroureter. No focal mass  identified.    06. LYMPHADENOPATHY/RETROPERITONEUM: There is no retroperitoneal lymphadenopathy. The abdominal aorta is normal in course and caliber.    07. BOWEL: No acute bowel related abnormalities. No evidence of appendiceal inflammation.    08. PELVIC VISCERA: The bladder wall appears prominent..  No pelvic mass.    09. PELVIC LYMPH NODES: No lymphadenopathy.    10. PERITONEUM/ABDOMINAL WALL: No ascites or implant.    11. SKELETAL: No aggressive appearing lytic/blastic lesion. No acute fractures, subluxations or dislocations.    12. LUNG BASES: The visualized lungs are unremarkable.  Impression: No acute abnormality identified within the abdomen and pelvis.  No evidence of acute GI hemorrhage.    The bladder wall appears prominent.  Correlate with urinalysis.    Electronically signed by: Reuben Elias  Date:    09/29/2023  Time:    16:05      Grzegorz Giraldo MD   09/30/2023

## 2023-09-30 NOTE — NURSING
Nurses Note -- 4 Eyes      9/30/2023   12:00 AM      Skin assessed during: Admit      [x] No Altered Skin Integrity Present    [x]Prevention Measures Documented      [] Yes- Altered Skin Integrity Present or Discovered   [] LDA Added if Not in Epic (Describe Wound)   [] New Altered Skin Integrity was Present on Admit and Documented in LDA   [] Wound Image Taken    Wound Care Consulted? No    Attending Nurse:  Latasha Hilliard RN/Staff Member:  Yoly Laurent RN

## 2023-09-30 NOTE — PROGRESS NOTES
Procedure results reported to floor nurse. Informed of medications given, IVF administered, blood administration, diet, and patient's stability. Nurse verbalized understanding.

## 2023-09-30 NOTE — H&P
[facility] Hospital Medicine History & Physical Examination       Patient Name: Jonathon aPtterson  MRN: [id number]  Patient Class: IP- Inpatient   Admission Date: 9/29/2023  1:24 PM  Length of Stay: 0  Admitting Service: Hospital Medicine   Attending Physician: Dinesh Newby MD   Primary Care Provider: Edmond Boyer DO  History source: EMR, patient and/or patient's family    CHIEF COMPLAINT   Fatigue (POV with weakness, SOB, low blood counts at rheumatologist office, reports dark tarry stool. Reports hx HTN, hasn't taken meds x2 weeks. Denies blood thinners.)      HISTORY OF PRESENT ILLNESS:   Mr. Padilla is a 28 year old male with a pmh of HTN and Rheumatoid arthritis presented to the ED with c/o fatigue, SOB.  He states that he had a wellness visit with his PCP on 9/20 and was noted to have low blood counts.  He does state that he has had black stool for about a week now. He denies anticoagulant use, regular NSAID use, or heavy etoh use.  Denies abdominal pain, fever, chills, or chest pain.    ED vitals on arrival:  Temp 98.1° F, pulse 120, resp 18, /92, SpO2 100% on RA.  ED lab work revealed H&H 4.3/15, retake 13.96, creatinine 1.33, .5.  EKG showed sinus tachycardia, otherwise normal EKG.  .  CT abdomen pelvis with and without contrast showed no acute abnormality identified within the abdomen and pelvis.  No evidence of acute GI hemorrhage.  The bladder wall appears prominent.  Correlate with urinalysis.  He was given 80 Protonix and Type and screened for 3 units PRBCs.  GI was consulted and he was admitted to hospital medicine for management.      PAST MEDICAL HISTORY:     Hypertension  Rheumatoid arthritis    PAST SURGICAL HISTORY:     Denies past surgery    ALLERGIES:   Patient has no known allergies.    FAMILY HISTORY:   Reviewed and non-contributory     SOCIAL HISTORY:     Tobacco use:  Denies  Etoh use:  Occasional  Illicit drug use:  Denies    "    HOME MEDICATIONS:     Prior to Admission medications    Medication Sig Start Date End Date Taking? Authorizing Provider   amLODIPine (NORVASC) 10 MG tablet Take 10 mg by mouth every morning. 9/20/23  Yes Provider, Historical   febuxostat (ULORIC) 80 mg Tab Take 1 tablet by mouth. 9/2/23  Yes Provider, Historical   losartan (COZAAR) 100 MG tablet Take 100 mg by mouth every morning. 8/11/23  Yes Provider, Historical   spironolactone (ALDACTONE) 25 MG tablet Take 25 mg by mouth every morning. 9/20/23  Yes Provider, Historical   upadacitinib (RINVOQ) 15 mg 24 hr tablet Take 15 mg by mouth.   Yes Provider, Historical       REVIEW OF SYSTEMS:   Except as documented, all other systems reviewed and negative     PHYSICAL EXAM:   T 99.2 °F (37.3 °C)   BP (!) 154/96   P 99   RR 18   O2 100 %  GENERAL: Well developed, well nourished. In no acute distress, non-toxic appearing.   HEENT: normocephalic atraumatic   NECK: supple   LUNGS: Clear bilaterally, no wheezing or rales, no accessory muscle use   CVS: Regular rate and rhythm, normal peripheral perfusion  ABD: Soft, non-tender, non-distended, bowel sounds present  EXTREMITIES: no clubbing or cyanosis  SKIN: Warm, dry.   NEURO: alert and oriented, grossly without focal deficits   PSYCHIATRIC: Cooperative    LABS AND IMAGING:     Recent Labs     09/29/23  1352   WBC 7.87   RBC 1.60*   HGB 4.3*   HCT 15.0*   MCV 93.8   MCH 26.9*   MCHC 28.7*   RDW 18.5*        No results for input(s): "LACTIC" in the last 72 hours.  Recent Labs     09/29/23  1352   INR 1.0     No results for input(s): "HGBA1C", "CHOL", "TRIG", "LDL", "VLDL", "HDL" in the last 72 hours.   Recent Labs     09/29/23  1352      K 3.9   CHLORIDE 109*   CO2 25   BUN 13.2   CREATININE 1.33*   GLUCOSE 84   CALCIUM 8.5   ALBUMIN 3.0*   GLOBULIN 3.5   ALKPHOS 54   ALT 11   AST 13   BILITOT 0.2      FERRITIN 69.71   IRON 37*   TIBC 265   TSH 1.812     Recent Labs     09/29/23  1352   .5* "   TROPONINI <0.010          CT Abdomen Pelvis W Wo Contrast  Narrative: EXAMINATION:  CT ABDOMEN PELVIS W WO CONTRAST    CLINICAL HISTORY:  GI bleed;Nausea/vomiting;periumbilical abd ttp;    TECHNIQUE:  Multidetector without and with IV contrast enhanced axial CT images of the abdomen and pelvis were obtained with coronal and sagittal reconstructions.    Automatic exposure control was utilized to reduce the patient's radiation dose.    DLP= 1810    COMPARISON:  No prior imaging available for comparison.    FINDINGS:  01. HEPATOBILIARY: No focal hepatic lesion is identified, The gallbladder is normal.    02. SPLEEN: Normal    03. PANCREAS: No focal masses or ductal dilatation.    04. ADRENALS: No adrenal nodules.    05. KIDNEYS: The right kidney demonstrates no stone, hydronephrosis, or hydroureter. No focal mass identified. The left kidney demonstrates no stone, hydronephrosis, or hydroureter. No focal mass identified.    06. LYMPHADENOPATHY/RETROPERITONEUM: There is no retroperitoneal lymphadenopathy. The abdominal aorta is normal in course and caliber.    07. BOWEL: No acute bowel related abnormalities. No evidence of appendiceal inflammation.    08. PELVIC VISCERA: The bladder wall appears prominent..  No pelvic mass.    09. PELVIC LYMPH NODES: No lymphadenopathy.    10. PERITONEUM/ABDOMINAL WALL: No ascites or implant.    11. SKELETAL: No aggressive appearing lytic/blastic lesion. No acute fractures, subluxations or dislocations.    12. LUNG BASES: The visualized lungs are unremarkable.  Impression: No acute abnormality identified within the abdomen and pelvis.  No evidence of acute GI hemorrhage.    The bladder wall appears prominent.  Correlate with urinalysis.    Electronically signed by: Reuben Elias  Date:    09/29/2023  Time:    16:05      ASSESSMENT & PLAN:     1. Acute symptomatic blood loss anemia  2.  Upper GI bleed  3.  Acute kidney injury    History:  Rheumatoid arthritis, HTN    PLAN:  -Transfuse  3 units PRBCs  -GI consulted in ED  -NPO after midnight  -LR @ 100 ml/hr  -Antihypertensives as needed  -Resume home meds as appropriate  -Labs in AM    I, Mery Krueger NP have reviewed and discussed this case with Dr. Newby.  Please see addendum for further assessment and plan from attending MD.    DVT prophylaxis: SCDs  Code status: Full        ________________________________________________________________________  I, Dr. Dinesh Newby assumed care of this patient.  For the patient encounter, I performed the substantive portion of the visit, I reviewed the NPPA documentation, treatment plan, and medical decision making.  I had face to face time with this patient.  I have personally reviewed the labs and test results that are presently available. I have reviewed or attempted to review medical records based upon their availability. If patient was admitted under observational status it is with my approval.      Pleasant 28-year-old male with RA on Rinvoq and hypertension presents with melena last week and severe symptomatic anemia with a hemoglobin of less than 5.  Heme-positive stool on rectal exam in ER.  Currently has a benign abdomen and agree with remainder of above exam.  NPO, transfuse blood to keep hemoglobin above 7, plan for EGD in a.m..    Time seen: 11PM 9/29/23  Critical care time: 35 min; Critical care diagnosis:  Anemia requiring transfusion  Dinesh Newby MD

## 2023-09-30 NOTE — PROVATION PATIENT INSTRUCTIONS
Discharge Summary/Instructions after an Endoscopic Procedure  Patient Name: Jonathon Patterson  Patient MRN: [id number]  Patient YOB: 1995 Saturday, September 30, 2023  Rob Killian MD  Dear patient,  As a result of recent federal legislation (The Federal Cures Act), you may   receive lab or pathology results from your procedure in your [facility]   account before your physician is able to contact you. Your physician or   their representative will relay the results to you with their   recommendations at their soonest availability.  Thank you,  RESTRICTIONS:  During your procedure today, you received medications for sedation.  These   medications may affect your judgment, balance and coordination.  Therefore,   for 24 hours, you have the following restrictions:   - DO NOT drive a car, operate machinery, make legal/financial decisions,   sign important papers or drink alcohol.    ACTIVITY:  Today: no heavy lifting, straining or running due to procedural   sedation/anesthesia.  The following day: return to full activity including work.  DIET:  Eat and drink normally unless instructed otherwise.     TREATMENT FOR COMMON SIDE EFFECTS:  - Mild abdominal pain, nausea, belching, bloating or excessive gas:  rest,   eat lightly and use a heating pad.  - Sore Throat: treat with throat lozenges and/or gargle with warm salt   water.  - Because air was used during the procedure, expelling large amounts of air   from your rectum or belching is normal.  - If a bowel prep was taken, you may not have a bowel movement for 1-3 days.    This is normal.  SYMPTOMS TO WATCH FOR AND REPORT TO YOUR PHYSICIAN:  1. Abdominal pain or bloating, other than gas cramps.  2. Chest pain.  3. Back pain.  4. Signs of infection such as: chills or fever occurring within 24 hours   after the procedure.  5. Rectal bleeding, which would show as bright red, maroon, or black stools.   (A tablespoon of blood from the rectum is not serious,  especially if   hemorrhoids are present.)  6. Vomiting.  7. Weakness or dizziness.  GO DIRECTLY TO THE NEAREST EMERGENCY ROOM IF YOU HAVE ANY OF THE FOLLOWING:      Difficulty breathing              Chills and/or fever over 101 F   Persistent vomiting and/or vomiting blood   Severe abdominal pain   Severe chest pain   Black, tarry stools   Bleeding- more than one tablespoon   Any other symptom or condition that you feel may need urgent attention  Your doctor recommends these additional instructions:  If any biopsies were taken, your doctors clinic will contact you in 1 to 2   weeks with any results.  - Return patient to hospital [facility] for ongoing care.   - Observe patient's clinical course.   - Perform an H. pylori stool antigen (HpSA) test at the next available   appointment.   - Use Protonix (pantoprazole) 40 mg PO BID.   - Repeat upper endoscopy in 12 weeks to check healing.   - The findings and recommendations were discussed with the patient.  For questions, problems or results please call your physician - Rob Killian MD at Work:  [phone].  [facility], EMERGENCY ROOM PHONE NUMBER: [phone]  IF A COMPLICATION OR EMERGENCY SITUATION ARISES AND YOU ARE UNABLE TO REACH   YOUR PHYSICIAN - GO DIRECTLY TO THE EMERGENCY ROOM.  MD Rob Rg MD  9/30/2023 1:29:36 PM  This report has been verified and signed electronically.  Dear patient,  As a result of recent federal legislation (The Federal Cures Act), you may   receive lab or pathology results from your procedure in your [facility]   account before your physician is able to contact you. Your physician or   their representative will relay the results to you with their   recommendations at their soonest availability.  Thank you,  PROVATION

## 2023-09-30 NOTE — ANESTHESIA POSTPROCEDURE EVALUATION
Anesthesia Post Evaluation    Patient: Jonathon Patterson    Procedure(s) Performed: Procedure(s) (LRB):  EGD (ESOPHAGOGASTRODUODENOSCOPY) (N/A)    Final Anesthesia Type: general      Patient location during evaluation: PACU  Patient participation: Yes- Able to Participate  Level of consciousness: awake and alert  Post-procedure vital signs: reviewed and stable  Pain management: adequate  Airway patency: patent    PONV status at discharge: No PONV  Anesthetic complications: no      Cardiovascular status: blood pressure returned to baseline  Respiratory status: spontaneous ventilation and unassisted  Hydration status: euvolemic  Follow-up needed           Vitals Value Taken Time   /82 09/30/23 1351   Temp 36.3 °C (97.4 °F) 09/30/23 1335   Pulse 94 09/30/23 1357   Resp 25 09/30/23 1357   SpO2 91 % 09/30/23 1357   Vitals shown include unvalidated device data.      No case tracking events are documented in the log.      Pain/Whit Score: Whit Score: 10 (9/30/2023  1:58 PM)

## 2023-09-30 NOTE — TRANSFER OF CARE
"Anesthesia Transfer of Care Note    Patient: Jonathon Patterson    Procedure(s) Performed: Procedure(s) (LRB):  EGD (ESOPHAGOGASTRODUODENOSCOPY) (N/A)    Patient location: PACU    Anesthesia Type: general    Transport from OR: Transported from OR on room air with adequate spontaneous ventilation    Post pain: adequate analgesia    Post assessment: no apparent anesthetic complications    Post vital signs: stable    Level of consciousness: awake and alert    Nausea/Vomiting: no nausea/vomiting    Complications: none    Transfer of care protocol was followed      Last vitals:   Visit Vitals  /79 (BP Location: Right arm, Patient Position: Lying)   Pulse 98   Temp 36.2 °C (97.2 °F) (Skin)   Resp 15   Ht 5' 10" (1.778 m)   Wt 114.3 kg (252 lb)   SpO2 99%   BMI 36.16 kg/m²     "

## 2023-09-30 NOTE — ANESTHESIA PREPROCEDURE EVALUATION
09/30/2023  Jonathon Patterson is a 28 y.o., male.      Pre-op Assessment    I have reviewed the Patient Summary Reports.     I have reviewed the Nursing Notes. I have reviewed the NPO Status.   I have reviewed the Medications.     Review of Systems  Anesthesia Hx:  No problems with previous Anesthesia    Social:  Non-Smoker    Cardiovascular:   Hypertension Denies MI.    Denies Angina.  Denies CHF.    Pulmonary:   Denies COPD.  Denies Asthma.    Renal/:   Denies Chronic Renal Disease. no renal calculi     Hepatic/GI:   Denies GERD. Denies Liver Disease.  Denies Hepatitis.    Neurological:   Denies CVA. Denies Seizures.    Endocrine:   Denies Diabetes. Denies Hypothyroidism. Denies Hyperthyroidism.  Obesity / BMI > 30      Physical Exam  General: Well nourished, Cooperative, Alert and Oriented    Airway:  Mallampati: I   Mouth Opening: Normal  TM Distance: Normal  Tongue: Normal  Neck ROM: Normal ROM    Dental:  Intact        Anesthesia Plan  Type of Anesthesia, risks & benefits discussed:    Anesthesia Type: Gen Natural Airway  Intra-op Monitoring Plan: Standard ASA Monitors  Induction:  IV  Informed Consent: Informed consent signed with the Patient and all parties understand the risks and agree with anesthesia plan.  All questions answered. Patient consented to blood products? Yes  ASA Score: 3  Day of Surgery Review of History & Physical: H&P Update referred to the surgeon/provider.  Anesthesia Plan Notes: Getting PRBC in preop. Can proceed to the OR with 1/2 unit given.    Ready For Surgery From Anesthesia Perspective.     .

## 2023-10-01 VITALS
DIASTOLIC BLOOD PRESSURE: 95 MMHG | OXYGEN SATURATION: 97 % | HEIGHT: 70 IN | BODY MASS INDEX: 36.08 KG/M2 | TEMPERATURE: 99 F | HEART RATE: 92 BPM | RESPIRATION RATE: 25 BRPM | WEIGHT: 252 LBS | SYSTOLIC BLOOD PRESSURE: 158 MMHG

## 2023-10-01 PROBLEM — K92.2 GIB (GASTROINTESTINAL BLEEDING): Status: ACTIVE | Noted: 2023-10-01

## 2023-10-01 LAB
ALBUMIN SERPL-MCNC: 2.7 G/DL (ref 3.5–5)
ALBUMIN/GLOB SERPL: 0.9 RATIO (ref 1.1–2)
ALP SERPL-CCNC: 55 UNIT/L (ref 40–150)
ALT SERPL-CCNC: 10 UNIT/L (ref 0–55)
AST SERPL-CCNC: 15 UNIT/L (ref 5–34)
BASOPHILS # BLD AUTO: 0.02 X10(3)/MCL
BASOPHILS NFR BLD AUTO: 0.3 %
BILIRUB SERPL-MCNC: 0.7 MG/DL
BUN SERPL-MCNC: 9.3 MG/DL (ref 8.9–20.6)
CALCIUM SERPL-MCNC: 7.7 MG/DL (ref 8.4–10.2)
CHLORIDE SERPL-SCNC: 108 MMOL/L (ref 98–107)
CO2 SERPL-SCNC: 22 MMOL/L (ref 22–29)
CREAT SERPL-MCNC: 1.15 MG/DL (ref 0.73–1.18)
EOSINOPHIL # BLD AUTO: 0.23 X10(3)/MCL (ref 0–0.9)
EOSINOPHIL NFR BLD AUTO: 3 %
ERYTHROCYTE [DISTWIDTH] IN BLOOD BY AUTOMATED COUNT: 15.9 % (ref 11.5–17)
GFR SERPLBLD CREATININE-BSD FMLA CKD-EPI: >60 MLS/MIN/1.73/M2
GLOBULIN SER-MCNC: 3 GM/DL (ref 2.4–3.5)
GLUCOSE SERPL-MCNC: 79 MG/DL (ref 74–100)
HCT VFR BLD AUTO: 24.2 % (ref 42–52)
HGB BLD-MCNC: 7.6 G/DL (ref 14–18)
IMM GRANULOCYTES # BLD AUTO: 0.03 X10(3)/MCL (ref 0–0.04)
IMM GRANULOCYTES NFR BLD AUTO: 0.4 %
LYMPHOCYTES # BLD AUTO: 1.1 X10(3)/MCL (ref 0.6–4.6)
LYMPHOCYTES NFR BLD AUTO: 14.2 %
MCH RBC QN AUTO: 27.3 PG (ref 27–31)
MCHC RBC AUTO-ENTMCNC: 31.4 G/DL (ref 33–36)
MCV RBC AUTO: 87.1 FL (ref 80–94)
MONOCYTES # BLD AUTO: 0.71 X10(3)/MCL (ref 0.1–1.3)
MONOCYTES NFR BLD AUTO: 9.2 %
NEUTROPHILS # BLD AUTO: 5.66 X10(3)/MCL (ref 2.1–9.2)
NEUTROPHILS NFR BLD AUTO: 72.9 %
NRBC BLD AUTO-RTO: 0 %
PLATELET # BLD AUTO: 302 X10(3)/MCL (ref 130–400)
PMV BLD AUTO: 9.6 FL (ref 7.4–10.4)
POTASSIUM SERPL-SCNC: 4.4 MMOL/L (ref 3.5–5.1)
PROT SERPL-MCNC: 5.7 GM/DL (ref 6.4–8.3)
RBC # BLD AUTO: 2.78 X10(6)/MCL (ref 4.7–6.1)
SODIUM SERPL-SCNC: 140 MMOL/L (ref 136–145)
WBC # SPEC AUTO: 7.75 X10(3)/MCL (ref 4.5–11.5)

## 2023-10-01 PROCEDURE — 25000003 PHARM REV CODE 250: Performed by: INTERNAL MEDICINE

## 2023-10-01 PROCEDURE — 85025 COMPLETE CBC W/AUTO DIFF WBC: CPT | Performed by: INTERNAL MEDICINE

## 2023-10-01 PROCEDURE — 25000003 PHARM REV CODE 250: Performed by: NURSE PRACTITIONER

## 2023-10-01 PROCEDURE — 80053 COMPREHEN METABOLIC PANEL: CPT | Performed by: INTERNAL MEDICINE

## 2023-10-01 RX ORDER — LANOLIN ALCOHOL/MO/W.PET/CERES
1 CREAM (GRAM) TOPICAL DAILY
Status: DISCONTINUED | OUTPATIENT
Start: 2023-10-01 | End: 2023-10-01 | Stop reason: HOSPADM

## 2023-10-01 RX ORDER — PANTOPRAZOLE SODIUM 40 MG/1
40 TABLET, DELAYED RELEASE ORAL
Status: DISCONTINUED | OUTPATIENT
Start: 2023-10-01 | End: 2023-10-01 | Stop reason: HOSPADM

## 2023-10-01 RX ORDER — PANTOPRAZOLE SODIUM 40 MG/1
40 TABLET, DELAYED RELEASE ORAL
Qty: 60 TABLET | Refills: 5 | Status: SHIPPED | OUTPATIENT
Start: 2023-10-01

## 2023-10-01 RX ORDER — FERROUS SULFATE 325(65) MG
325 TABLET, DELAYED RELEASE (ENTERIC COATED) ORAL 2 TIMES DAILY
Qty: 60 TABLET | Refills: 5 | Status: SHIPPED | OUTPATIENT
Start: 2023-10-01

## 2023-10-01 RX ADMIN — LABETALOL HYDROCHLORIDE 200 MG: 200 TABLET, FILM COATED ORAL at 08:10

## 2023-10-01 RX ADMIN — AMLODIPINE BESYLATE 10 MG: 5 TABLET ORAL at 08:10

## 2023-10-01 NOTE — PROGRESS NOTES
"Gastroenterology Progress Note    Subjective/Interval History:  10-1-23  EGD yesterday with non bleeding ulcer and Grade D esophagitis  S/p 4 units of blood  Feels much better  Discussed - no NSAIDS, no ETOH    ROS:  12 point system reviewed and is negative except as noted in HPI     Vital Signs:  BP (!) 158/95   Pulse 92   Temp 98.9 °F (37.2 °C) (Oral)   Resp (!) 25   Ht 5' 10" (1.778 m)   Wt 114.3 kg (252 lb)   SpO2 97%   BMI 36.16 kg/m²   Body mass index is 36.16 kg/m².    Physical Exam:  Constitutional: He appears well-developed. He is not diaphoretic. No distress.   HENT:   Head: Normocephalic and atraumatic.   Nose: Nose normal.   Mouth/Throat: Oropharynx is clear and moist.   Eyes: Conjunctivae and EOM are normal. Pupils are equal, round, and reactive to light.   Cardiovascular:  Normal rate and regular rhythm.           No murmur heard.  Pulmonary/Chest: Breath sounds normal. No respiratory distress. He has no wheezes. He has no rales.   Abdominal: Abdomen is soft. He exhibits no distension.  Non tender to palpationT  Neurological: He is alert and oriented to person, place, and time. He has normal strength. No cranial nerve deficit.   Skin: Skin is warm. Capillary refill takes less than 2 seconds. No rash noted.    Labs:  Recent Results (from the past 48 hour(s))   Comprehensive metabolic panel    Collection Time: 09/29/23  1:52 PM   Result Value Ref Range    Sodium Level 144 136 - 145 mmol/L    Potassium Level 3.9 3.5 - 5.1 mmol/L    Chloride 109 (H) 98 - 107 mmol/L    Carbon Dioxide 25 22 - 29 mmol/L    Glucose Level 84 74 - 100 mg/dL    Blood Urea Nitrogen 13.2 8.9 - 20.6 mg/dL    Creatinine 1.33 (H) 0.73 - 1.18 mg/dL    Calcium Level Total 8.5 8.4 - 10.2 mg/dL    Protein Total 6.5 6.4 - 8.3 gm/dL    Albumin Level 3.0 (L) 3.5 - 5.0 g/dL    Globulin 3.5 2.4 - 3.5 gm/dL    Albumin/Globulin Ratio 0.9 (L) 1.1 - 2.0 ratio    Bilirubin Total 0.2 <=1.5 mg/dL    Alkaline Phosphatase 54 40 - 150 unit/L    " Alanine Aminotransferase 11 0 - 55 unit/L    Aspartate Aminotransferase 13 5 - 34 unit/L    eGFR >60 mls/min/1.73/m2   Troponin I    Collection Time: 09/29/23  1:52 PM   Result Value Ref Range    Troponin-I <0.010 0.000 - 0.045 ng/mL   Type & Screen    Collection Time: 09/29/23  1:52 PM   Result Value Ref Range    Group & Rh B POS     Indirect Brii GEL NEG     Specimen Outdate 10/02/2023 23:59    TSH    Collection Time: 09/29/23  1:52 PM   Result Value Ref Range    Thyroid Stimulating Hormone 1.812 0.350 - 4.940 uIU/mL   APTT    Collection Time: 09/29/23  1:52 PM   Result Value Ref Range    PTT 28.0 23.2 - 33.7 seconds   Protime-INR    Collection Time: 09/29/23  1:52 PM   Result Value Ref Range    PT 12.8 12.5 - 14.5 seconds    INR 1.0 <=1.3   CBC with Differential    Collection Time: 09/29/23  1:52 PM   Result Value Ref Range    WBC 7.87 4.50 - 11.50 x10(3)/mcL    RBC 1.60 (L) 4.70 - 6.10 x10(6)/mcL    Hgb 4.3 (LL) 14.0 - 18.0 g/dL    Hct 15.0 (LL) 42.0 - 52.0 %    MCV 93.8 80.0 - 94.0 fL    MCH 26.9 (L) 27.0 - 31.0 pg    MCHC 28.7 (L) 33.0 - 36.0 g/dL    RDW 18.5 (H) 11.5 - 17.0 %    Platelet 355 130 - 400 x10(3)/mcL    MPV 9.6 7.4 - 10.4 fL    Neut % 66.9 %    Lymph % 26.8 %    Mono % 4.7 %    Eos % 1.1 %    Basophil % 0.1 %    Lymph # 2.11 0.6 - 4.6 x10(3)/mcL    Neut # 5.26 2.1 - 9.2 x10(3)/mcL    Mono # 0.37 0.1 - 1.3 x10(3)/mcL    Eos # 0.09 0 - 0.9 x10(3)/mcL    Baso # 0.01 <=0.2 x10(3)/mcL    IG# 0.03 0 - 0.04 x10(3)/mcL    IG% 0.4 %    NRBC% 0.6 %   Brain natriuretic peptide    Collection Time: 09/29/23  1:52 PM   Result Value Ref Range    Natriuretic Peptide 146.5 (H) <=100.0 pg/mL   Prepare RBC 3 Units; emergency    Collection Time: 09/29/23  1:52 PM   Result Value Ref Range    UNIT NUMBER [id number]     UNIT ABO/RH B POS     DISPENSE STATUS Transfused     Unit Expiration [id number]     Product Code V1097P19     Unit Blood Type Code 7300     CROSSMATCH INTERPRETATION Compatible     UNIT NUMBER  [id number]     UNIT ABO/RH B POS     DISPENSE STATUS Transfused     Unit Expiration [id number]     Product Code Q8902C70     Unit Blood Type Code 7300     CROSSMATCH INTERPRETATION Compatible     UNIT NUMBER [id number]     UNIT ABO/RH B POS     DISPENSE STATUS Transfused     Unit Expiration [id number]     Product Code Q7474T90     Unit Blood Type Code 7300     CROSSMATCH INTERPRETATION Compatible    Lactate Dehydrogenase    Collection Time: 09/29/23  1:52 PM   Result Value Ref Range    Lactate Dehydrogenase 164 125 - 220 U/L   Ferritin    Collection Time: 09/29/23  1:52 PM   Result Value Ref Range    Ferritin Level 69.71 21.81 - 274.66 ng/mL   Reticulocytes    Collection Time: 09/29/23  1:52 PM   Result Value Ref Range    Retic Cnt Auto 13.96 (H) 1.1 - 2.1 %    RET# 0.2068 (H) 0.026 - 0.095 x10e6/uL   Iron Panel    Collection Time: 09/29/23  1:52 PM   Result Value Ref Range    Iron Binding Capacity Unsaturated 228 69 - 240 ug/dL    Iron Level 37 (L) 65 - 175 ug/dL    Transferrin 234 174 - 364 mg/dL    Iron Binding Capacity Total 265 250 - 450 ug/dL    Iron Saturation 14 (L) 20 - 50 %   Prepare RBC 1 Unit    Collection Time: 09/29/23  1:52 PM   Result Value Ref Range    UNIT NUMBER [id number]     UNIT ABO/RH B NEG     DISPENSE STATUS Transfused     Unit Expiration [id number]     Product Code S1506H96     Unit Blood Type Code 1700     CROSSMATCH INTERPRETATION Compatible    ABORH RETYPE    Collection Time: 09/29/23  4:10 PM   Result Value Ref Range    ABORH Retype B POS    Urinalysis, Reflex to Urine Culture    Collection Time: 09/29/23  5:01 PM    Specimen: Urine   Result Value Ref Range    Color, UA Yellow Yellow, Light-Yellow, Dark Yellow, [place], Straw    Appearance, UA Clear Clear    Specific Gravity, UA 1.013 1.005 - 1.030    pH, UA 6.0 5.0 - 8.5    Protein, UA Negative Negative    Glucose, UA Negative Negative, Normal    Ketones, UA Negative Negative    Blood, UA Negative Negative    Bilirubin, UA  Negative Negative    Urobilinogen, UA 0.2 0.2, 1.0, Normal    Nitrites, UA Negative Negative    Leukocyte Esterase, UA Negative Negative   Urinalysis, Microscopic    Collection Time: 09/29/23  5:01 PM   Result Value Ref Range    RBC, UA <5 <=5 /HPF    WBC, UA <5 <=5 /HPF    Squamous Epithelial Cells, UA <5 <=5 /HPF    Bacteria, UA None Seen None Seen, Rare, Occasional /HPF   Comprehensive Metabolic Panel (CMP)    Collection Time: 09/30/23  8:15 AM   Result Value Ref Range    Sodium Level 142 136 - 145 mmol/L    Potassium Level 4.1 3.5 - 5.1 mmol/L    Chloride 112 (H) 98 - 107 mmol/L    Carbon Dioxide 17 (L) 22 - 29 mmol/L    Glucose Level 81 74 - 100 mg/dL    Blood Urea Nitrogen 9.7 8.9 - 20.6 mg/dL    Creatinine 1.07 0.73 - 1.18 mg/dL    Calcium Level Total 8.2 (L) 8.4 - 10.2 mg/dL    Protein Total 6.5 6.4 - 8.3 gm/dL    Albumin Level 2.9 (L) 3.5 - 5.0 g/dL    Globulin 3.6 (H) 2.4 - 3.5 gm/dL    Albumin/Globulin Ratio 0.8 (L) 1.1 - 2.0 ratio    Bilirubin Total 0.4 <=1.5 mg/dL    Alkaline Phosphatase 59 40 - 150 unit/L    Alanine Aminotransferase 12 0 - 55 unit/L    Aspartate Aminotransferase 13 5 - 34 unit/L    eGFR >60 mls/min/1.73/m2   Magnesium    Collection Time: 09/30/23  8:15 AM   Result Value Ref Range    Magnesium Level 2.10 1.60 - 2.60 mg/dL   Phosphorus    Collection Time: 09/30/23  8:15 AM   Result Value Ref Range    Phosphorus Level 3.9 2.3 - 4.7 mg/dL   CBC with Differential    Collection Time: 09/30/23 10:15 AM   Result Value Ref Range    WBC 7.89 4.50 - 11.50 x10(3)/mcL    RBC 2.45 (L) 4.70 - 6.10 x10(6)/mcL    Hgb 6.7 (L) 14.0 - 18.0 g/dL    Hct 21.4 (L) 42.0 - 52.0 %    MCV 87.3 80.0 - 94.0 fL    MCH 27.3 27.0 - 31.0 pg    MCHC 31.3 (L) 33.0 - 36.0 g/dL    RDW 16.4 11.5 - 17.0 %    Platelet 290 130 - 400 x10(3)/mcL    MPV 9.0 7.4 - 10.4 fL    Neut % 79.4 %    Lymph % 12.4 %    Mono % 6.0 %    Eos % 1.5 %    Basophil % 0.3 %    Lymph # 0.98 0.6 - 4.6 x10(3)/mcL    Neut # 6.27 2.1 - 9.2 x10(3)/mcL     Mono # 0.47 0.1 - 1.3 x10(3)/mcL    Eos # 0.12 0 - 0.9 x10(3)/mcL    Baso # 0.02 <=0.2 x10(3)/mcL    IG# 0.03 0 - 0.04 x10(3)/mcL    IG% 0.4 %    NRBC% 0.4 %   Comprehensive Metabolic Panel    Collection Time: 10/01/23  5:34 AM   Result Value Ref Range    Sodium Level 140 136 - 145 mmol/L    Potassium Level 4.4 3.5 - 5.1 mmol/L    Chloride 108 (H) 98 - 107 mmol/L    Carbon Dioxide 22 22 - 29 mmol/L    Glucose Level 79 74 - 100 mg/dL    Blood Urea Nitrogen 9.3 8.9 - 20.6 mg/dL    Creatinine 1.15 0.73 - 1.18 mg/dL    Calcium Level Total 7.7 (L) 8.4 - 10.2 mg/dL    Protein Total 5.7 (L) 6.4 - 8.3 gm/dL    Albumin Level 2.7 (L) 3.5 - 5.0 g/dL    Globulin 3.0 2.4 - 3.5 gm/dL    Albumin/Globulin Ratio 0.9 (L) 1.1 - 2.0 ratio    Bilirubin Total 0.7 <=1.5 mg/dL    Alkaline Phosphatase 55 40 - 150 unit/L    Alanine Aminotransferase 10 0 - 55 unit/L    Aspartate Aminotransferase 15 5 - 34 unit/L    eGFR >60 mls/min/1.73/m2   CBC with Differential    Collection Time: 10/01/23  5:34 AM   Result Value Ref Range    WBC 7.75 4.50 - 11.50 x10(3)/mcL    RBC 2.78 (L) 4.70 - 6.10 x10(6)/mcL    Hgb 7.6 (L) 14.0 - 18.0 g/dL    Hct 24.2 (L) 42.0 - 52.0 %    MCV 87.1 80.0 - 94.0 fL    MCH 27.3 27.0 - 31.0 pg    MCHC 31.4 (L) 33.0 - 36.0 g/dL    RDW 15.9 11.5 - 17.0 %    Platelet 302 130 - 400 x10(3)/mcL    MPV 9.6 7.4 - 10.4 fL    Neut % 72.9 %    Lymph % 14.2 %    Mono % 9.2 %    Eos % 3.0 %    Basophil % 0.3 %    Lymph # 1.10 0.6 - 4.6 x10(3)/mcL    Neut # 5.66 2.1 - 9.2 x10(3)/mcL    Mono # 0.71 0.1 - 1.3 x10(3)/mcL    Eos # 0.23 0 - 0.9 x10(3)/mcL    Baso # 0.02 <=0.2 x10(3)/mcL    IG# 0.03 0 - 0.04 x10(3)/mcL    IG% 0.4 %    NRBC% 0.0 %       Imaging:  CT Abdomen Pelvis W Wo Contrast    Result Date: 9/29/2023  EXAMINATION: CT ABDOMEN PELVIS W WO CONTRAST CLINICAL HISTORY: GI bleed;Nausea/vomiting;periumbilical abd ttp; TECHNIQUE: Multidetector without and with IV contrast enhanced axial CT images of the abdomen and pelvis were  obtained with coronal and sagittal reconstructions. Automatic exposure control was utilized to reduce the patient's radiation dose. DLP= 1810 COMPARISON: No prior imaging available for comparison. FINDINGS: 01. HEPATOBILIARY: No focal hepatic lesion is identified, The gallbladder is normal. 02. SPLEEN: Normal 03. PANCREAS: No focal masses or ductal dilatation. 04. ADRENALS: No adrenal nodules. 05. KIDNEYS: The right kidney demonstrates no stone, hydronephrosis, or hydroureter. No focal mass identified. The left kidney demonstrates no stone, hydronephrosis, or hydroureter. No focal mass identified. 06. LYMPHADENOPATHY/RETROPERITONEUM: There is no retroperitoneal lymphadenopathy. The abdominal aorta is normal in course and caliber. 07. BOWEL: No acute bowel related abnormalities. No evidence of appendiceal inflammation. 08. PELVIC VISCERA: The bladder wall appears prominent..  No pelvic mass. 09. PELVIC LYMPH NODES: No lymphadenopathy. 10. PERITONEUM/ABDOMINAL WALL: No ascites or implant. 11. SKELETAL: No aggressive appearing lytic/blastic lesion. No acute fractures, subluxations or dislocations. 12. LUNG BASES: The visualized lungs are unremarkable.     No acute abnormality identified within the abdomen and pelvis.  No evidence of acute GI hemorrhage. The bladder wall appears prominent.  Correlate with urinalysis. Electronically signed by: Reuben Elias Date:    09/29/2023 Time:    16:05         Assessment/Plan:  Assessment/Plan:  Symptomatic anemia  Hgb 4.3  Getting 3 units PRBC  PPI  Heme + on digital  Iron - 37  RA  On Rimvoq (NSAID/ DMARD)  HTN  NSAID use        EGD 9-30 - non bleedinggastric ulcer and Grade D esophagitis  Continue PPI BID  Avoid NSAIDS  F/u EGD in 12 weeks - office will call to set appointment  Iron supplements  Iron rich diet    Ok to dc from GI standpoint       Aliyah Newby NP as scribe for Dr. Rob Killian

## 2023-10-01 NOTE — DISCHARGE SUMMARY
[facility] Hospital Medicine Discharge Summary    Admit Date: 9/29/2023  Discharge Date and Time: 10/1/2023 12:04 PM  Admitting Physician:  Team  Discharging Physician: Grzegorz Giraldo MD.  Primary Care Physician: Edmond Boyer DO  Consults: Gastroenterology    Discharge Diagnoses:  Esophagitis  Upper gastrointestinal hemorrhage  Iron-deficiency anemia status post transfusion 3 units packed red blood cells  Acute kidney injury, resolved  Rheumatoid arthritis  Immunocompromised   Hypertensive urgency      Hospital Course:   Mr. Padilla is a 28 year old male with a past medical history of hypertension and rheumatoid arthritis presented to the ED with c/o fatigue, SOB.  He states that he had a wellness visit with his PCP on 9/20 and was noted to have low blood counts.  He does state that he has had black stool for about a week now. He denies anticoagulant use, regular NSAID use, or excess alcohol use.  Denies abdominal pain, fever, chills, or chest pain.     ED vitals on arrival:  Temp 98.1° F, pulse 120, resp 18, /92, SpO2 100% on RA.  ED lab work revealed H&H 4.3/15, retake 13.96, creatinine 1.33, .5.  EKG showed sinus tachycardia, otherwise normal EKG.  .  CT abdomen pelvis with and without contrast showed no acute abnormality identified within the abdomen and pelvis.  No evidence of acute GI hemorrhage.  The bladder wall appears prominent.  Correlate with urinalysis.  He was given 80 mg of Protonix and typed and screened for 3 units PRBCs.  Patient was very promptly admitted to the hospital medicine service and gastroenterology was consulted.    Patient had an EGD on 09/30/2023 revealing:  LA Grade D (one or more mucosal breaks involving at least 75% of        esophageal circumference) esophagitis with bleeding was found.        One non-bleeding superficial gastric ulcer with no stigmata of        bleeding was found in the cardia. The lesion was 4 mm in  largest        dimension.        The examined duodenum was normal.    Recommendations were for Protonix 40 mg orally twice daily and iron supplementation with a repeat upper endoscopy in 12 weeks.  Patient was cleared discharge home by Gastroenterology    Patient is was seen and examined on the day of discharge.      Vitals:  VITAL SIGNS: 24 HRS MIN & MAX LAST   Temp  Min: 97.2 °F (36.2 °C)  Max: 99.5 °F (37.5 °C) 98.9 °F (37.2 °C)   BP  Min: 117/79  Max: 158/95 (!) 158/95   Pulse  Min: 89  Max: 102  92   Resp  Min: 15  Max: 44 (!) 25   SpO2  Min: 92 %  Max: 99 % 97 %       Physical Exam:  General:  Obese male in no acute distress  HENT: normocephalic, atraumatic  Eye: PERRL, EOMI, clear conjunctiva  Neck: full ROM, no thyromegaly, no JVD  Respiratory: clear to auscultation bilaterally  Cardiovascular: regular rate and rhythm  Gastrointestinal: non-distended, positive bowel sounds, non-tender  Musculoskeletal: no gross deformity  Integumentary: warm, dry, intact, no rashes  Neurological: cranial nerves grossly intact, no focal neurological deficit  Psychiatric: cooperative, anxious       Procedures Performed: No admission procedures for hospital encounter.     Significant Diagnostic Studies: See Full reports for all details    Recent Labs   Lab 09/29/23  1352 09/30/23  1015 10/01/23  0534   WBC 7.87 7.89 7.75   RBC 1.60* 2.45* 2.78*   HGB 4.3* 6.7* 7.6*   HCT 15.0* 21.4* 24.2*   MCV 93.8 87.3 87.1   MCH 26.9* 27.3 27.3   MCHC 28.7* 31.3* 31.4*   RDW 18.5* 16.4 15.9    290 302   MPV 9.6 9.0 9.6       Recent Labs   Lab 09/29/23  1352 09/30/23  0815 10/01/23  0534    142 140   K 3.9 4.1 4.4   CO2 25 17* 22   BUN 13.2 9.7 9.3   CREATININE 1.33* 1.07 1.15   CALCIUM 8.5 8.2* 7.7*   MG  --  2.10  --    ALBUMIN 3.0* 2.9* 2.7*   ALKPHOS 54 59 55   ALT 11 12 10   AST 13 13 15   BILITOT 0.2 0.4 0.7        Microbiology Results (last 7 days)       ** No results found for the last 168 hours. **             CT Abdomen  Pelvis W Wo Contrast  Narrative: EXAMINATION:  CT ABDOMEN PELVIS W WO CONTRAST    CLINICAL HISTORY:  GI bleed;Nausea/vomiting;periumbilical abd ttp;    TECHNIQUE:  Multidetector without and with IV contrast enhanced axial CT images of the abdomen and pelvis were obtained with coronal and sagittal reconstructions.    Automatic exposure control was utilized to reduce the patient's radiation dose.    DLP= 1810    COMPARISON:  No prior imaging available for comparison.    FINDINGS:  01. HEPATOBILIARY: No focal hepatic lesion is identified, The gallbladder is normal.    02. SPLEEN: Normal    03. PANCREAS: No focal masses or ductal dilatation.    04. ADRENALS: No adrenal nodules.    05. KIDNEYS: The right kidney demonstrates no stone, hydronephrosis, or hydroureter. No focal mass identified. The left kidney demonstrates no stone, hydronephrosis, or hydroureter. No focal mass identified.    06. LYMPHADENOPATHY/RETROPERITONEUM: There is no retroperitoneal lymphadenopathy. The abdominal aorta is normal in course and caliber.    07. BOWEL: No acute bowel related abnormalities. No evidence of appendiceal inflammation.    08. PELVIC VISCERA: The bladder wall appears prominent..  No pelvic mass.    09. PELVIC LYMPH NODES: No lymphadenopathy.    10. PERITONEUM/ABDOMINAL WALL: No ascites or implant.    11. SKELETAL: No aggressive appearing lytic/blastic lesion. No acute fractures, subluxations or dislocations.    12. LUNG BASES: The visualized lungs are unremarkable.  Impression: No acute abnormality identified within the abdomen and pelvis.  No evidence of acute GI hemorrhage.    The bladder wall appears prominent.  Correlate with urinalysis.    Electronically signed by: Reuben Elias  Date:    09/29/2023  Time:    16:05         Medication List        START taking these medications      ferrous sulfate 325 (65 FE) MG EC tablet  Take 1 tablet (325 mg total) by mouth 2 (two) times daily.     pantoprazole 40 MG tablet  Commonly  known as: PROTONIX  Take 1 tablet (40 mg total) by mouth 2 (two) times daily before meals.            CONTINUE taking these medications      amLODIPine 10 MG tablet  Commonly known as: NORVASC     febuxostat 80 mg Tab  Commonly known as: ULORIC     losartan 100 MG tablet  Commonly known as: COZAAR     RINVOQ 15 mg 24 hr tablet  Generic drug: upadacitinib     spironolactone 25 MG tablet  Commonly known as: ALDACTONE               Where to Get Your Medications        These medications were sent to [id number] - [place] [address] AT [facility] & [place] [address], [facility] [address]      Phone: [phone]   ferrous sulfate 325 (65 FE) MG EC tablet  pantoprazole 40 MG tablet          Explained in detail to the patient about the discharge plan, medications, and follow-up visits. Pt understands and agrees with the treatment plan  Discharge Disposition: Home  Discharged Condition: stable  Diet-   Dietary Orders (From admission, onward)       Start     Ordered    10/01/23 0828  Diet Adult Regular  Diet effective now         10/01/23 0827                     For further questions contact your gastroenterologist    Discharge time 35 minutes    For worsening symptoms, chest pain, shortness of breath, increased abdominal pain, high grade fever, stroke or stroke like symptoms, immediately go to the nearest Emergency Room or call 911 as soon as possible.      Grzegorz Neumann M.D, on 10/1/2023. at 12:04 PM.

## 2023-10-02 ENCOUNTER — PATIENT OUTREACH (OUTPATIENT)
Dept: ADMINISTRATIVE | Facility: CLINIC | Age: 28
End: 2023-10-02
Payer: COMMERCIAL

## 2023-10-02 NOTE — PROGRESS NOTES
C3 nurse attempted to contact Jonathon Patterson  for a TCC post hospital discharge follow up call. No answer. The patient does not have a scheduled HOSFU appointment, and the pt does not have an [facility] PCP.

## 2023-10-03 NOTE — PROGRESS NOTES
C3 nurse spoke with Jonathon Patterson  for a TCC post hospital discharge follow up call. The patient reports he has a scheduled HOSFU with PCP Edmond Byoer MD on 10/4/23.

## 2024-01-01 PROBLEM — K92.2 GIB (GASTROINTESTINAL BLEEDING): Status: RESOLVED | Noted: 2023-10-01 | Resolved: 2024-01-01

## (undated) DEVICE — KIT CANIST SUCTION 1200CC

## (undated) DEVICE — TUBING O2 FEMALE CONN 13FT

## (undated) DEVICE — KIT SURGICAL COLON .25 1.1OZ

## (undated) DEVICE — TIP SUCTION YANKAUER

## (undated) DEVICE — COLLECTION SPECIMEN NEPTUNE

## (undated) DEVICE — SOL IRRI STRL WATER 1000ML